# Patient Record
Sex: FEMALE | Race: NATIVE HAWAIIAN OR OTHER PACIFIC ISLANDER | NOT HISPANIC OR LATINO | Employment: FULL TIME | URBAN - METROPOLITAN AREA
[De-identification: names, ages, dates, MRNs, and addresses within clinical notes are randomized per-mention and may not be internally consistent; named-entity substitution may affect disease eponyms.]

---

## 2017-01-27 ENCOUNTER — OFFICE VISIT (OUTPATIENT)
Dept: OTHER | Facility: IMAGING CENTER | Age: 37
End: 2017-01-27
Payer: COMMERCIAL

## 2017-01-27 DIAGNOSIS — Z91.09 ENVIRONMENTAL ALLERGIES: ICD-10-CM

## 2017-01-27 DIAGNOSIS — K58.1 IRRITABLE BOWEL SYNDROME WITH CONSTIPATION: ICD-10-CM

## 2017-01-27 PROCEDURE — 99213 OFFICE O/P EST LOW 20 MIN: CPT | Mod: 25 | Performed by: FAMILY MEDICINE

## 2017-01-27 PROCEDURE — 97810 ACUP 1/> WO ESTIM 1ST 15 MIN: CPT | Performed by: FAMILY MEDICINE

## 2017-01-27 PROCEDURE — 97811 ACUP 1/> W/O ESTIM EA ADD 15: CPT | Performed by: FAMILY MEDICINE

## 2017-01-27 NOTE — MR AVS SNAPSHOT
Afshan Huitron   2017 3:00 PM   Office Visit   MRN: [id number]    Department:  Acupuncture Med   Dept Phone:  [phone]    Description:  Female : 1980   Provider:  Olivia Tiwari M.D.           Allergies as of 2017     No Known Allergies      Vital Signs     Last Menstrual Period Smoking Status                2016 Never Smoker           Basic Information     Date Of Birth Sex Race Ethnicity Preferred Language    1980 Female , Unknown Non- English      Your appointments     Feb 10, 2017  3:30 PM   ACUPUNCTURE 30 with Olivia Tiwari M.D.   [facility] (--)    [address]  [place] [address]   [phone]            2017  3:30 PM   ACUPUNCTURE 30 with Olivia Tiwari M.D.   [facility] (--)    [address]  [place] [address]   [phone]              Problem List              ICD-10-CM Priority Class Noted - Resolved    Irritable bowel syndrome with constipation K58.1   10/26/2016 - Present      Health Maintenance        Date Due Completion Dates    PAP SMEAR 2019 (Done)    Override on 2016: Done    IMM DTaP/Tdap/Td Vaccine (2 - Td) 10/26/2026 10/26/2016            Current Immunizations     Influenza Vaccine Quad Inj (Preserved) 10/26/2016  9:50 AM    MMR Vaccine 2016    Tdap Vaccine 10/26/2016  9:48 AM      Below and/or attached are the medications your provider expects you to take. Review all of your home medications and newly ordered medications with your provider and/or pharmacist. Follow medication instructions as directed by your provider and/or pharmacist. Please keep your medication list with you and share with your provider. Update the information when medications are discontinued, doses are changed, or new medications (including over-the-counter products) are added; and carry medication information at all times in the event of  emergency situations     Allergies:  No Known Allergies          Medications  Valid as of: January 27, 2017 -  3:59 PM    Generic Name Brand Name Tablet Size Instructions for use    .                 Medicines prescribed today were sent to:     [facility] [address] [facility] [address].     [place] [address]    Phone: [phone] Fax: [phone]    Open 24 Hours?: No      Medication refill instructions:       If your prescription bottle indicates you have medication refills left, it is not necessary to call your provider’s office. Please contact your pharmacy and they will refill your medication.    If your prescription bottle indicates you do not have any refills left, you may request refills at any time through one of the following ways: The online [employer] system (except Urgent Care), by calling your provider’s office, or by asking your pharmacy to contact your provider’s office with a refill request. Medication refills are processed only during regular business hours and may not be available until the next business day. Your provider may request additional information or to have a follow-up visit with you prior to refilling your medication.   *Please Note: Medication refills are assigned a new Rx number when refilled electronically. Your pharmacy may indicate that no refills were authorized even though a new prescription for the same medication is available at the pharmacy. Please request the medicine by name with the pharmacy before contacting your provider for a refill.           [employer] Access Code: Activation code not generated  Current [employer] Status: Active

## 2017-01-27 NOTE — PROGRESS NOTES
[facility] Acupuncture Progress Note  [address]  [place] [address]  Dept: [phone]      Patient Name: Afshan Huitron  YOB: 1980  Date of Service: 1/27/2017  2:56 PM    CC IBS and allergies.    HPI This is a 36 year old female here for IBS and allergies.   Has had irritable bowel syndrome and had acupuncture therapy in the past which helped. Has intermittent symptoms. Mostly due to stress previously. Not a lot of stress currently, but anxious about whether she may be allergic to her new condo.   She has been experiencing allergy symptoms with dry eyes, puffy eyes, congestion, some sneezing and cough. No rhinorrhea.    Her previously treatment with acupuncture was back in 2010 or 2011. She tolerated it well and had good benefit from it.   Currently she denies any symptoms of diarrhea or constipation. Denies any blood in stool or black stools.   She is not taking any current medications for her GI issues or her allergy symptoms.      ROS Season: no real favorite  Color preference: no real favorite  Taste preference: sweet and salty.   Lives in [place] for 10 years. Just moved here.   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   Single, no child.   Hobbies- sports, snowboarding, hiking, mountain biking.       PMH Past Medical History   Diagnosis Date   • Endometriosis       SH Social History     Social History   • Marital Status: Single     Spouse Name: N/A   • Number of Children: N/A   • Years of Education: N/A     Social History Main Topics   • Smoking status: Never Smoker    • Smokeless tobacco: Never Used   • Alcohol Use: No   • Drug Use: No   • Sexual Activity: No     Other Topics Concern   • None     Social History Narrative      MEDS No current outpatient prescriptions on file prior to visit.     No current facility-administered medications on file prior to visit.      ALLERGIES No Known Allergies   PE Tongue: pink tip, pale, thin white coat  Pulses: R-  prox->dist,  supf->deep ++/+, +/+, +/++; L-+/+, +/+, +/++     Assesment Eastern Qi deficiency- SP    Western 1. Irritable bowel syndrome with constipation     2. Environmental allergies               Plan Set 1: L -Brooks Yin, Ruiz Ryemundo 3:6, SP 9  Set 2: GV 24.5, LI 20 b/l; L, R- LI 4, LI 11, SP 10, ST 36, SP6  Set 3: ear carroll men b/l, allergy pt b/l  Set 4:      >16 minutes of this 30 minute interview were spent in discussing the benefits and utility of acupuncture.  I answered patient questions about efficacy, safety, and what to expect during a treatment, and the likely number of treatments needed.  Patient will schedule another appointment to return for treatment.  Have encouraged the patient to rest after the acupuncture session today - taking naps or going to sleep early as necessary.  Increase intake of water and refrain from strenuous activities.  Patient may expect to feel transient worsening of symptoms, but this should resolve to benefit in the next day or two after treatment.    Olivia Tiwari M.D.

## 2017-02-02 ENCOUNTER — APPOINTMENT (OUTPATIENT)
Dept: RADIOLOGY | Facility: IMAGING CENTER | Age: 37
End: 2017-02-02
Attending: PHYSICIAN ASSISTANT
Payer: COMMERCIAL

## 2017-02-02 ENCOUNTER — OFFICE VISIT (OUTPATIENT)
Dept: URGENT CARE | Facility: CLINIC | Age: 37
End: 2017-02-02
Payer: COMMERCIAL

## 2017-02-02 VITALS
DIASTOLIC BLOOD PRESSURE: 68 MMHG | WEIGHT: 130 LBS | SYSTOLIC BLOOD PRESSURE: 104 MMHG | RESPIRATION RATE: 16 BRPM | HEART RATE: 76 BPM | OXYGEN SATURATION: 95 % | TEMPERATURE: 98.4 F | BODY MASS INDEX: 21.63 KG/M2
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
## 2017-02-02 DIAGNOSIS — M25.511 ACUTE PAIN OF RIGHT SHOULDER: ICD-10-CM

## 2017-02-02 DIAGNOSIS — S22.31XA CLOSED FRACTURE OF ONE RIB OF RIGHT SIDE, INITIAL ENCOUNTER: ICD-10-CM

## 2017-02-02 PROCEDURE — 99214 OFFICE O/P EST MOD 30 MIN: CPT | Performed by: PHYSICIAN ASSISTANT

## 2017-02-02 PROCEDURE — 73030 X-RAY EXAM OF SHOULDER: CPT | Mod: TC,RT | Performed by: PHYSICIAN ASSISTANT

## 2017-02-02 ASSESSMENT — ENCOUNTER SYMPTOMS
FEVER: 0
SENSORY CHANGE: 0
TINGLING: 0
FOCAL WEAKNESS: 0
CHILLS: 0
FALLS: 1

## 2017-02-02 NOTE — PROGRESS NOTES
Subjective:      Afshan Huitron is a 36 y.o. female who presents with Shoulder Pain            Shoulder Pain  Pertinent negatives include no chills or fever.   NOtes 3wks s/p fall on ice, fall backwards, notes reached back falling back and landed on bilateral elbows jarring shoulder upwards, c/o pain to upper right shoulder, denies PMH of surg or injury to right shoulder. Pt is right hand dominant. Has had issues w/ sleeping due to side sleeper. C/o pain over deltoid of right shoulder.     Review of Systems   Constitutional: Negative for fever and chills.   Musculoskeletal: Positive for joint pain ( pos for right shoulder pain) and falls ( single fall).   Neurological: Negative for tingling, sensory change and focal weakness.       PMH:  has a past medical history of Endometriosis.  MEDS: No current outpatient prescriptions on file.  ALLERGIES: No Known Allergies  SURGHX:   Past Surgical History   Procedure Laterality Date   • Laparoscopy       SOCHX:  reports that she has never smoked. She has never used smokeless tobacco. She reports that she does not drink alcohol or use illicit drugs.  FH: Family history was reviewed, no pertinent findings to report    I have worn a mask for the entire encounter with this patient.      Objective:     /68 mmHg  Pulse 76  Temp(Src) 36.9 °C (98.4 °F)  Resp 16  Wt 58.968 kg (130 lb)  SpO2 95%     Physical Exam   Constitutional: She is oriented to person, place, and time. She appears well-developed and well-nourished. No distress.   HENT:   Head: Normocephalic and atraumatic.   Right Ear: External ear normal.   Left Ear: External ear normal.   Nose: Nose normal.   Eyes: Conjunctivae and lids are normal. Right eye exhibits no discharge. Left eye exhibits no discharge. No scleral icterus.   Neck: Neck supple.   Pulmonary/Chest: Effort normal and breath sounds normal. No respiratory distress. She has no decreased breath sounds. She has no wheezes. She has no rhonchi. She has  no rales.       Musculoskeletal: Normal range of motion.        Right shoulder: She exhibits tenderness ( mild over delt and very mild over ACJ) and pain. She exhibits normal range of motion, no bony tenderness, no swelling, no effusion, no crepitus, no deformity, no laceration, no spasm, normal pulse and normal strength ( RC 5/5).   NEG speeds/neers/jergasons       Neurological: She is alert and oriented to person, place, and time. She is not disoriented. Coordination and gait normal.   Skin: Skin is warm and dry. She is not diaphoretic. No erythema. No pallor.   Psychiatric: Her speech is normal and behavior is normal.   Nursing note and vitals reviewed.    Dx shoulder -   2/2/2017 9:15 AM    HISTORY/REASON FOR EXAM:  Pain/Deformity Following Trauma  Fall on ice 3 weeks ago    TECHNIQUE/EXAM DESCRIPTION AND NUMBER OF VIEWS:  3 views of the RIGHT shoulder.    COMPARISON: None    FINDINGS: Examination is technically limited.  There is no evidence of fracture or dislocation. Glenohumeral and acromioclavicular articulation is maintained.  Visualized right lung field is clear. Deformity of the posterior eighth rib on the right likely represents a fracture in advanced stages of   healing.           Impression        No evidence of acute fracture or dislocation.    Deformity of the posterior eighth rib on the right likely represents a fracture in advanced stages of healing.         Reading Provider Reading Date     Magalis Johnson M.D. Feb 2, 2017         Signing Provider Signing Date Signing Time     Magalis Johnson M.D. Feb 2, 2017  9:20 AM               Assessment/Plan:     1. Closed fracture of one rib of right side, initial encounter  Recommend conservative care, rest, ice, elevation, work on gentle ROM exercises, avoid nsaids for tylenol OTC, declines any additional meds, trend resolution, PROM shoulder  RTC w/ cough / fever/chest congestion, deep breathing exercises discussed  Return to clinic with lack of  resolution or progression of symptoms.      2. Acute pain of right shoulder    - DX-SHOULDER 2+ RIGHT; Future    3. Fall, initial encounter

## 2017-02-02 NOTE — MR AVS SNAPSHOT
Afshan Huitron   2017 8:45 AM   Office Visit   MRN: [id number]    Department:  [facility]   Dept Phone:  [phone]    Description:  Female : 1980   Provider:  Jonatan Bashir PA-C           Reason for Visit     Shoulder Pain x 3 wk, Rt. shoulder pain after fall.  Pain is worse w/ some movements      Allergies as of 2017     No Known Allergies      You were diagnosed with     Acute pain of right shoulder   [[id number]]       Right rotator cuff tendinitis   [[id number]]         Vital Signs     Blood Pressure Pulse Temperature Respirations Weight Oxygen Saturation    104/68 mmHg 76 36.9 °C (98.4 °F) 16 58.968 kg (130 lb) 95%    Smoking Status                   Never Smoker            Basic Information     Date Of Birth Sex Race Ethnicity Preferred Language    1980 Female , Unknown Non- English      Your appointments     Feb 10, 2017  3:30 PM   ACUPUNCTURE 30 with Olivia Tiwari M.D.   [facility] (--)    [address]  [place] [address]   [phone]            2017  3:30 PM   ACUPUNCTURE 30 with Olivia Tiwari M.D.   [facility] (--)    [address]  [place] [address]   [phone]              Problem List              ICD-10-CM Priority Class Noted - Resolved    Irritable bowel syndrome with constipation K58.1   10/26/2016 - Present      Health Maintenance        Date Due Completion Dates    PAP SMEAR 2019 (Done)    Override on 2016: Done    IMM DTaP/Tdap/Td Vaccine (2 - Td) 10/26/2026 10/26/2016            Current Immunizations     Influenza Vaccine Quad Inj (Preserved) 10/26/2016  9:50 AM    MMR Vaccine 2016    Tdap Vaccine 10/26/2016  9:48 AM      Below and/or attached are the medications your provider expects you to take. Review all of your home medications and newly ordered medications with your provider and/or pharmacist. Follow  medication instructions as directed by your provider and/or pharmacist. Please keep your medication list with you and share with your provider. Update the information when medications are discontinued, doses are changed, or new medications (including over-the-counter products) are added; and carry medication information at all times in the event of emergency situations     Allergies:  No Known Allergies          Medications  Valid as of: February 02, 2017 -  9:34 AM    Generic Name Brand Name Tablet Size Instructions for use    .                 Medicines prescribed today were sent to:     [facility] [address] [facility].    [address] [place] [address] [place] [address]    Phone: [phone] Fax: [phone]    Open 24 Hours?: No      Medication refill instructions:       If your prescription bottle indicates you have medication refills left, it is not necessary to call your provider’s office. Please contact your pharmacy and they will refill your medication.    If your prescription bottle indicates you do not have any refills left, you may request refills at any time through one of the following ways: The online [employer] system (except Urgent Care), by calling your provider’s office, or by asking your pharmacy to contact your provider’s office with a refill request. Medication refills are processed only during regular business hours and may not be available until the next business day. Your provider may request additional information or to have a follow-up visit with you prior to refilling your medication.   *Please Note: Medication refills are assigned a new Rx number when refilled electronically. Your pharmacy may indicate that no refills were authorized even though a new prescription for the same medication is available at the pharmacy. Please request the medicine by name with the pharmacy before contacting your provider for a refill.        Your To Do List     Future Labs/Procedures Complete By Expires     DX-SHOULDER 2+ RIGHT  As directed 2/2/2018         [employer] Access Code: Activation code not generated  Current [employer] Status: Active

## 2017-02-24 ENCOUNTER — OFFICE VISIT (OUTPATIENT)
Dept: OTHER | Facility: IMAGING CENTER | Age: 37
End: 2017-02-24
Payer: COMMERCIAL

## 2017-02-24 DIAGNOSIS — K58.1 IRRITABLE BOWEL SYNDROME WITH CONSTIPATION: ICD-10-CM

## 2017-02-24 DIAGNOSIS — Z91.09 ENVIRONMENTAL ALLERGIES: ICD-10-CM

## 2017-02-24 PROCEDURE — 97810 ACUP 1/> WO ESTIM 1ST 15 MIN: CPT | Performed by: FAMILY MEDICINE

## 2017-02-24 PROCEDURE — 99213 OFFICE O/P EST LOW 20 MIN: CPT | Mod: 25 | Performed by: FAMILY MEDICINE

## 2017-02-24 PROCEDURE — 97811 ACUP 1/> W/O ESTIM EA ADD 15: CPT | Performed by: FAMILY MEDICINE

## 2017-02-24 NOTE — MR AVS SNAPSHOT
Afshan Huitron   2017 3:30 PM   Office Visit   MRN: [id number]    Department:  Acupuncture Med   Dept Phone:  [phone]    Description:  Female : 1980   Provider:  Olivia Tiwari M.D.           Allergies as of 2017     No Known Allergies      Vital Signs     Smoking Status                   Never Smoker            Basic Information     Date Of Birth Sex Race Ethnicity Preferred Language    1980 Female , Unknown Non- English      Problem List              ICD-10-CM Priority Class Noted - Resolved    Irritable bowel syndrome with constipation K58.1   10/26/2016 - Present      Health Maintenance        Date Due Completion Dates    PAP SMEAR 2019 (Done)    Override on 2016: Done    IMM DTaP/Tdap/Td Vaccine (2 - Td) 10/26/2026 10/26/2016            Current Immunizations     Influenza Vaccine Quad Inj (Preserved) 10/26/2016  9:50 AM    MMR Vaccine 2016    Tdap Vaccine 10/26/2016  9:48 AM      Below and/or attached are the medications your provider expects you to take. Review all of your home medications and newly ordered medications with your provider and/or pharmacist. Follow medication instructions as directed by your provider and/or pharmacist. Please keep your medication list with you and share with your provider. Update the information when medications are discontinued, doses are changed, or new medications (including over-the-counter products) are added; and carry medication information at all times in the event of emergency situations     Allergies:  No Known Allergies          Medications  Valid as of: 2017 -  4:31 PM    Generic Name Brand Name Tablet Size Instructions for use    .                 Medicines prescribed today were sent to:     [facility] - [facility] - [address] [facility].    [address] [place], [place] [address] [address]    Phone: [phone] Fax: [phone]    Open 24 Hours?: No      Medication refill instructions:       If your  prescription bottle indicates you have medication refills left, it is not necessary to call your provider’s office. Please contact your pharmacy and they will refill your medication.    If your prescription bottle indicates you do not have any refills left, you may request refills at any time through one of the following ways: The online [employer] system (except Urgent Care), by calling your provider’s office, or by asking your pharmacy to contact your provider’s office with a refill request. Medication refills are processed only during regular business hours and may not be available until the next business day. Your provider may request additional information or to have a follow-up visit with you prior to refilling your medication.   *Please Note: Medication refills are assigned a new Rx number when refilled electronically. Your pharmacy may indicate that no refills were authorized even though a new prescription for the same medication is available at the pharmacy. Please request the medicine by name with the pharmacy before contacting your provider for a refill.           [employer] Access Code: Activation code not generated  Current [employer] Status: Active

## 2017-02-24 NOTE — PROGRESS NOTES
[facility] Acupuncture Progress Note  [address]  [place] [address]  Dept: [phone]      Patient Name: Afshan Huitron  YOB: 1980  Date of Service: 2/24/2017  3:40 PM    CC IBS and allergies.    HPI This is a 36 year old female here for IBS and allergies.   Has had irritable bowel syndrome and had acupuncture therapy in the past which helped.   Initial treatment was good. Had improvements in IBS for a while after her treatment.  She has noted some improvement in her allergies as well.   Not on medication therapy for her issues.   Right lateral shoulder region with pain with some movements. Had prior fall.      ROS Season: no real favorite  Color preference: no real favorite  Taste preference: sweet and salty.   Lives in [place] for 10 years. Just moved here.   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   Single, no child.   Hobbies- sports, snowboarding, hiking, mountain biking.       PMH Past Medical History   Diagnosis Date   • Endometriosis       SH Social History     Social History   • Marital Status: Single     Spouse Name: N/A   • Number of Children: N/A   • Years of Education: N/A     Social History Main Topics   • Smoking status: Never Smoker    • Smokeless tobacco: Never Used   • Alcohol Use: No   • Drug Use: No   • Sexual Activity: No     Other Topics Concern   • Not on file     Social History Narrative      MEDS No current outpatient prescriptions on file prior to visit.     No current facility-administered medications on file prior to visit.      ALLERGIES No Known Allergies   PE Tongue:  Not examined  Pulses:  Not examined.     Assesment Eastern Qi deficiency- SP    Western 1. Irritable bowel syndrome with constipation     2. Environmental allergies               Plan Set 1: L -Brooks Yin, Ruiz Reymundo 3:6, SP 9  Set 2: GV 24.5, LI 20 b/l; L, R- LI 4, LI 11, SP 10, ST 36, SP 6  Set 3: ear carroll men b/l, allergy pt b/l  Set 4:    Follow up in 1 week.     > 15 minutes of face  to face time spent with patient, excluding time spent for procedure.     Olivia Tiwari M.D.

## 2017-06-07 ENCOUNTER — HOSPITAL ENCOUNTER (OUTPATIENT)
Facility: MEDICAL CENTER | Age: 37
End: 2017-06-07
Attending: PHYSICIAN ASSISTANT
Payer: COMMERCIAL

## 2017-06-07 ENCOUNTER — OFFICE VISIT (OUTPATIENT)
Dept: URGENT CARE | Facility: CLINIC | Age: 37
End: 2017-06-07
Payer: COMMERCIAL

## 2017-06-07 VITALS
BODY MASS INDEX: 20.66 KG/M2 | OXYGEN SATURATION: 99 % | SYSTOLIC BLOOD PRESSURE: 104 MMHG | RESPIRATION RATE: 14 BRPM | DIASTOLIC BLOOD PRESSURE: 64 MMHG | WEIGHT: 124 LBS | HEART RATE: 68 BPM | TEMPERATURE: 99.4 F | HEIGHT: 65 IN

## 2017-06-07 DIAGNOSIS — L08.9 SUPERFICIAL SKIN INFECTION: ICD-10-CM

## 2017-06-07 PROCEDURE — 99214 OFFICE O/P EST MOD 30 MIN: CPT | Performed by: PHYSICIAN ASSISTANT

## 2017-06-07 PROCEDURE — 87205 SMEAR GRAM STAIN: CPT

## 2017-06-07 PROCEDURE — 87075 CULTR BACTERIA EXCEPT BLOOD: CPT

## 2017-06-07 PROCEDURE — 87070 CULTURE OTHR SPECIMN AEROBIC: CPT

## 2017-06-07 PROCEDURE — 87077 CULTURE AEROBIC IDENTIFY: CPT

## 2017-06-07 RX ORDER — SULFAMETHOXAZOLE AND TRIMETHOPRIM 800; 160 MG/1; MG/1
1 TABLET ORAL 2 TIMES DAILY
Qty: 10 TAB | Refills: 0 | Status: SHIPPED | OUTPATIENT
Start: 2017-06-07 | End: 2017-06-11

## 2017-06-07 ASSESSMENT — ENCOUNTER SYMPTOMS
CHILLS: 0
MUSCULOSKELETAL NEGATIVE: 1
DIARRHEA: 0
NAUSEA: 0
SHORTNESS OF BREATH: 0
VOMITING: 0
HEADACHES: 0
DIZZINESS: 0
FEVER: 0
ABDOMINAL PAIN: 0

## 2017-06-07 NOTE — MR AVS SNAPSHOT
"        Afshan Huitron   2017 10:15 AM   Office Visit   MRN: [id number]    Department:  [facility]   Dept Phone:  [phone]    Description:  Female : 1980   Provider:  Greer Joshi PA-C           Reason for Visit     Abrasion (R) calf x 3 days and concern it might be infected and wants to rule out DVT      Allergies as of 2017     No Known Allergies      You were diagnosed with     Superficial skin infection   [[id number]]         Vital Signs     Blood Pressure Pulse Temperature Respirations Height Weight    104/64 mmHg 68 37.4 °C (99.4 °F) 14 1.651 m (5' 5\") 56.246 kg (124 lb)    Body Mass Index Oxygen Saturation Smoking Status             20.63 kg/m2 99% Never Smoker          Basic Information     Date Of Birth Sex Race Ethnicity Preferred Language    1980 Female , Unknown Non- English      Problem List              ICD-10-CM Priority Class Noted - Resolved    Irritable bowel syndrome with constipation K58.1   10/26/2016 - Present      Health Maintenance        Date Due Completion Dates    PAP SMEAR 2019 (Done)    Override on 2016: Done    IMM DTaP/Tdap/Td Vaccine (2 - Td) 10/26/2026 10/26/2016            Current Immunizations     Influenza Vaccine Quad Inj (Preserved) 10/26/2016  9:50 AM    MMR Vaccine 2016    Tdap Vaccine 10/26/2016  9:48 AM      Below and/or attached are the medications your provider expects you to take. Review all of your home medications and newly ordered medications with your provider and/or pharmacist. Follow medication instructions as directed by your provider and/or pharmacist. Please keep your medication list with you and share with your provider. Update the information when medications are discontinued, doses are changed, or new medications (including over-the-counter products) are added; and carry medication information at all times in the event of emergency situations     Allergies:  No Known Allergies          Medications "  Valid as of: June 07, 2017 - 11:31 AM    Generic Name Brand Name Tablet Size Instructions for use    Sulfamethoxazole-Trimethoprim (Tab) BACTRIM -160 MG Take 1 Tab by mouth 2 times a day for 5 days.        .                 Medicines prescribed today were sent to:     [facility] [place] - [address] [facility].    [address] [facility] [place] [address]    Phone: [phone] Fax: [phone]    Open 24 Hours?: No      Medication refill instructions:       If your prescription bottle indicates you have medication refills left, it is not necessary to call your provider’s office. Please contact your pharmacy and they will refill your medication.    If your prescription bottle indicates you do not have any refills left, you may request refills at any time through one of the following ways: The online [employer] system (except Urgent Care), by calling your provider’s office, or by asking your pharmacy to contact your provider’s office with a refill request. Medication refills are processed only during regular business hours and may not be available until the next business day. Your provider may request additional information or to have a follow-up visit with you prior to refilling your medication.   *Please Note: Medication refills are assigned a new Rx number when refilled electronically. Your pharmacy may indicate that no refills were authorized even though a new prescription for the same medication is available at the pharmacy. Please request the medicine by name with the pharmacy before contacting your provider for a refill.           [employer] Access Code: Activation code not generated  Current [employer] Status: Active
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
## 2017-06-08 ENCOUNTER — HOSPITAL ENCOUNTER (EMERGENCY)
Facility: MEDICAL CENTER | Age: 37
End: 2017-06-08
Attending: EMERGENCY MEDICINE
Payer: COMMERCIAL

## 2017-06-08 VITALS
SYSTOLIC BLOOD PRESSURE: 130 MMHG | DIASTOLIC BLOOD PRESSURE: 86 MMHG | OXYGEN SATURATION: 99 % | RESPIRATION RATE: 16 BRPM | HEIGHT: 65 IN | WEIGHT: 124.34 LBS | TEMPERATURE: 99.7 F | HEART RATE: 77 BPM | BODY MASS INDEX: 20.72 KG/M2

## 2017-06-08 DIAGNOSIS — L03.116 CELLULITIS OF LEFT LOWER EXTREMITY: ICD-10-CM

## 2017-06-08 LAB
GRAM STN SPEC: NORMAL
SIGNIFICANT IND 70042: NORMAL
SITE SITE: NORMAL
SOURCE SOURCE: NORMAL

## 2017-06-08 PROCEDURE — 99284 EMERGENCY DEPT VISIT MOD MDM: CPT

## 2017-06-08 PROCEDURE — A9270 NON-COVERED ITEM OR SERVICE: HCPCS | Performed by: EMERGENCY MEDICINE

## 2017-06-08 PROCEDURE — 700102 HCHG RX REV CODE 250 W/ 637 OVERRIDE(OP): Performed by: EMERGENCY MEDICINE

## 2017-06-08 RX ORDER — CEPHALEXIN 500 MG/1
500 CAPSULE ORAL ONCE
Status: COMPLETED | OUTPATIENT
Start: 2017-06-08 | End: 2017-06-08

## 2017-06-08 RX ORDER — SULFAMETHOXAZOLE AND TRIMETHOPRIM 800; 160 MG/1; MG/1
2 TABLET ORAL ONCE
Status: COMPLETED | OUTPATIENT
Start: 2017-06-08 | End: 2017-06-08

## 2017-06-08 RX ORDER — CEPHALEXIN 500 MG/1
500 CAPSULE ORAL 4 TIMES DAILY
Qty: 28 CAP | Refills: 0 | Status: ON HOLD | OUTPATIENT
Start: 2017-06-08 | End: 2017-06-17

## 2017-06-08 RX ORDER — SULFAMETHOXAZOLE AND TRIMETHOPRIM 800; 160 MG/1; MG/1
2 TABLET ORAL EVERY 12 HOURS
Qty: 28 TAB | Refills: 0 | Status: ON HOLD | OUTPATIENT
Start: 2017-06-08 | End: 2017-06-17

## 2017-06-08 RX ADMIN — SULFAMETHOXAZOLE AND TRIMETHOPRIM 2 TABLET: 800; 160 TABLET ORAL at 22:22

## 2017-06-08 RX ADMIN — CEPHALEXIN 500 MG: 500 CAPSULE ORAL at 22:22

## 2017-06-08 ASSESSMENT — LIFESTYLE VARIABLES: DO YOU DRINK ALCOHOL: NO

## 2017-06-08 ASSESSMENT — PAIN SCALES - GENERAL: PAINLEVEL_OUTOF10: 8

## 2017-06-08 NOTE — ED AVS SNAPSHOT
Home Care Instructions                                                                                                                Afshan Huitron   MRN: [id number]    Department:  [facility], Emergency Dept   Date of Visit:  6/8/2017            [facility], Emergency Dept    [address] [address]    Phone:  [phone]      You were seen by     César Busby M.D.      Your Diagnosis Was     Cellulitis of left lower extremity     L03.116       Follow-up Information     1. Follow up with George Ta M.D. In 2 days.    Specialty:  Internal Medicine    Contact information    [address]  [address]  [facility] [address] [phone]          2. Follow up with [facility], Emergency Dept.    Specialty:  Emergency Medicine    Why:  If symptoms worsen    Contact information    [address] [facility] [address] [phone]      Medication Information     Review all of your home medications and newly ordered medications with your primary doctor and/or pharmacist as soon as possible. Follow medication instructions as directed by your doctor and/or pharmacist.     Please keep your complete medication list with you and share with your physician. Update the information when medications are discontinued, doses are changed, or new medications (including over-the-counter products) are added; and carry medication information at all times in the event of emergency situations.               Medication List      START taking these medications        Instructions    Morning Afternoon Evening Bedtime    cephALEXin 500 MG Caps   Commonly known as:  KEFLEX        Take 1 Cap by mouth 4 times a day.   Dose:  500 mg                          ASK your doctor about these medications        Instructions    Morning Afternoon Evening Bedtime    * sulfamethoxazole-trimethoprim 800-160 MG tablet   What changed:  Another medication with the same name was  added. Make sure you understand how and when to take each.   Commonly known as:  BACTRIM DS   Ask about: Which instructions should I use?        Take 1 Tab by mouth 2 times a day for 5 days.   Dose:  1 Tab                        * sulfamethoxazole-trimethoprim 800-160 MG tablet   What changed:  You were already taking a medication with the same name, and this prescription was added. Make sure you understand how and when to take each.   Commonly known as:  BACTRIM DS   Ask about: Which instructions should I use?        Take 2 Tabs by mouth every 12 hours for 7 days.   Dose:  2 Tab                        * Notice:  This list has 2 medication(s) that are the same as other medications prescribed for you. Read the directions carefully, and ask your doctor or other care provider to review them with you.         Where to Get Your Medications      You can get these medications from any pharmacy     Bring a paper prescription for each of these medications    - cephALEXin 500 MG Caps  - sulfamethoxazole-trimethoprim 800-160 MG tablet              Discharge Instructions       Cellulitis  Cellulitis is an infection of the skin and the tissue under the skin. The infected area is usually red and tender. This happens most often in the arms and lower legs.  HOME CARE   · Take your antibiotic medicine as told. Finish the medicine even if you start to feel better.  · Keep the infected arm or leg raised (elevated).  · Put a warm cloth on the area up to 4 times per day.  · Only take medicines as told by your doctor.  · Keep all doctor visits as told.  GET HELP IF:  · You see red streaks on the skin coming from the infected area.  · Your red area gets bigger or turns a dark color.  · Your bone or joint under the infected area is painful after the skin heals.  · Your infection comes back in the same area or different area.  · You have a puffy (swollen) bump in the infected area.  · You have new symptoms.  · You have a fever.  GET HELP  RIGHT AWAY IF:   · You feel very sleepy.  · You throw up (vomit) or have watery poop (diarrhea).  · You feel sick and have muscle aches and pains.  MAKE SURE YOU:   · Understand these instructions.  · Will watch your condition.  · Will get help right away if you are not doing well or get worse.     This information is not intended to replace advice given to you by your health care provider. Make sure you discuss any questions you have with your health care provider.     Document Released: 06/05/2009 Document Revised: 01/08/2016 Document Reviewed: 03/04/2013  [employer] Patient Education ©2016 [employer]            Patient Information     Patient Information    Following emergency treatment: all patient requiring follow-up care must return either to a private physician or a clinic if your condition worsens before you are able to obtain further medical attention, please return to the emergency room.     Billing Information    At [facility], we work to make the billing process streamlined for our patients.  Our Representatives are here to answer any questions you may have regarding your hospital bill.  If you have insurance coverage and have supplied your insurance information to us, we will submit a claim to your insurer on your behalf.  Should you have any questions regarding your bill, we can be reached online or by phone as follows:  Online: You are able pay your bills online or live chat with our representatives about any billing questions you may have. We are here to help Monday - Friday from 8:00am to 7:30pm and 9:00am - 12:00pm on Saturdays.  Please visit [email or web address]  for more information.   Phone:  [phone] or [phone]    Please note that your emergency physician, surgeon, pathologist, radiologist, anesthesiologist, and other specialists are not employed by [facility] and will therefore bill separately for their services.  Please contact them  directly for any questions concerning their bills at the numbers below:     Emergency Physician Services:  [phone]  [facility]:  [phone]  [facility]:  [phone]  [facility]:  [phone]    1. Your final bill may vary from the amount quoted upon discharge if all procedures are not complete at that time, or if your doctor has additional procedures of which we are not aware. You will receive an additional bill if you return to the Emergency Department at Critical access hospital for suture removal regardless of the facility of which the sutures were placed.     2. Please arrange for settlement of this account at the emergency registration.    3. All self-pay accounts are due in full at the time of treatment.  If you are unable to meet this obligation then payment is expected within 4-5 days.     4. If you have had radiology studies (CT, X-ray, Ultrasound, MRI), you have received a preliminary result during your emergency department visit. Please contact the radiology department [phone] to receive a copy of your final result. Please discuss the Final result with your primary physician or with the follow up physician provided.     Crisis Hotline:  [place] Crisis Hotline:  9-212-FRLDION or 1-583.384.9661  [place] Crisis Hotline:    1-508.354.1861 or 645-246-6534         ED Discharge Follow Up Questions    1. In order to provide you with very good care, we would like to follow up with a phone call in the next few days.  May we have your permission to contact you?     YES /  NO    2. What is the best phone number to call you? (       )_____-__________    3. What is the best time to call you?      Morning  /  Afternoon  /  Evening                   Patient Signature:  ____________________________________________________________    Date:  ____________________________________________________________

## 2017-06-08 NOTE — ED AVS SNAPSHOT
[employer] Access Code: Activation code not generated  Current [employer] Status: Active    [employer]  A secure, online tool to manage your health information     [employer]® is a secure, online tool that connects you to your personalized health information from the privacy of your home -- day or night - making it very easy for you to manage your healthcare. Once the activation process is completed, you can even access your medical information using the [employer] orestes, which is available for free in the Apple Orestes store or Google Play store.     [employer] provides the following levels of access (as shown below):   My Chart Features   [facility] Primary Care Doctor [facility]  Specialists [facility]  Urgent  Care Non-[facility]  Primary Care  Doctor   Email your healthcare team securely and privately 24/7 X X X X   Manage appointments: schedule your next appointment; view details of past/upcoming appointments X      Request prescription refills. X      View recent personal medical records, including lab and immunizations X X X X   View health record, including health history, allergies, medications X X X X   Read reports about your outpatient visits, procedures, consult and ER notes X X X X   See your discharge summary, which is a recap of your hospital and/or ER visit that includes your diagnosis, lab results, and care plan. X X       How to register for [employer]:  1. Go to  [email or web address].  2. Click on the Sign Up Now box, which takes you to the New Member Sign Up page. You will need to provide the following information:  a. Enter your [employer] Access Code exactly as it appears at the top of this page. (You will not need to use this code after you’ve completed the sign-up process. If you do not sign up before the expiration date, you must request a new code.)   b. Enter your date of birth.   c. Enter your home email address.   d. Click Submit, and follow the next screen’s instructions.  3. Create a [employer] ID. This will  be your [employer] login ID and cannot be changed, so think of one that is secure and easy to remember.  4. Create a [employer] password. You can change your password at any time.  5. Enter your Password Reset Question and Answer. This can be used at a later time if you forget your password.   6. Enter your e-mail address. This allows you to receive e-mail notifications when new information is available in [employer].  7. Click Sign Up. You can now view your health information.    For assistance activating your [employer] account, call [phone]

## 2017-06-08 NOTE — ED AVS SNAPSHOT
6/8/2017    Afshan Huitron  [address]  [place] [address]    Dear Afshan:    [facility] wants to ensure your discharge home is safe and you or your loved ones have had all of your questions answered regarding your care after you leave the hospital.    Below is a list of resources and contact information should you have any questions regarding your hospital stay, follow-up instructions, or active medical symptoms.    Questions or Concerns Regarding… Contact   Medical Questions Related to Your Discharge  (7 days a week, 8am-5pm) Contact a Nurse Care Coordinator   [phone]   Medical Questions Not Related to Your Discharge  (24 hours a day / 7 days a week)  Contact the Nurse Health Line   [phone]    Medications or Discharge Instructions Refer to your discharge packet   or contact your [facility] Primary Care Provider   [phone]   Follow-up Appointment(s) Schedule your appointment via [employer]   or contact Scheduling [phone]   Billing Review your statement via [employer]  or contact Billing [phone]   Medical Records Review your records via [employer]   or contact Medical Records [phone]     You may receive a telephone call within two days of discharge. This call is to make certain you understand your discharge instructions and have the opportunity to have any questions answered. You can also easily access your medical information, test results and upcoming appointments via the [employer] free online health management tool. You can learn more and sign up at [employer] [facility]. For assistance setting up your [employer] account, please call [phone].    Once again, we want to ensure your discharge home is safe and that you have a clear understanding of any next steps in your care. If you have any questions or concerns, please do not hesitate to contact us, we are here for you. Thank you for choosing [facility] for your healthcare needs.    Sincerely,    Your [facility] Healthcare Team

## 2017-06-09 LAB
BACTERIA WND AEROBE CULT: ABNORMAL
GRAM STN SPEC: ABNORMAL
SIGNIFICANT IND 70042: ABNORMAL
SITE SITE: ABNORMAL
SOURCE SOURCE: ABNORMAL

## 2017-06-09 NOTE — ED NOTES
Pt ambulatory with steady gait, pt verbalized understanding of poc and discharge , no questions at this time.  Family at bedside, educated pt to remain in room for 10min to ensure no reaction to medications given.  VSS

## 2017-06-09 NOTE — ED NOTES
Pt ambulatory to triage with RLE cellulitis, dx at urgent care yesterday & prescribed Bactrim.  Pt received phone call today that the wound was strep positive & that she needs to be put on a different antibiotic.
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
## 2017-06-09 NOTE — DISCHARGE INSTRUCTIONS
Cellulitis  Cellulitis is an infection of the skin and the tissue under the skin. The infected area is usually red and tender. This happens most often in the arms and lower legs.  HOME CARE   · Take your antibiotic medicine as told. Finish the medicine even if you start to feel better.  · Keep the infected arm or leg raised (elevated).  · Put a warm cloth on the area up to 4 times per day.  · Only take medicines as told by your doctor.  · Keep all doctor visits as told.  GET HELP IF:  · You see red streaks on the skin coming from the infected area.  · Your red area gets bigger or turns a dark color.  · Your bone or joint under the infected area is painful after the skin heals.  · Your infection comes back in the same area or different area.  · You have a puffy (swollen) bump in the infected area.  · You have new symptoms.  · You have a fever.  GET HELP RIGHT AWAY IF:   · You feel very sleepy.  · You throw up (vomit) or have watery poop (diarrhea).  · You feel sick and have muscle aches and pains.  MAKE SURE YOU:   · Understand these instructions.  · Will watch your condition.  · Will get help right away if you are not doing well or get worse.     This information is not intended to replace advice given to you by your health care provider. Make sure you discuss any questions you have with your health care provider.     Document Released: 06/05/2009 Document Revised: 01/08/2016 Document Reviewed: 03/04/2013  [employer] Patient Education ©2016 [employer]

## 2017-06-10 LAB
BACTERIA SPEC ANAEROBE CULT: NORMAL
SIGNIFICANT IND 70042: NORMAL
SITE SITE: NORMAL
SOURCE SOURCE: NORMAL

## 2017-06-11 ENCOUNTER — HOSPITAL ENCOUNTER (INPATIENT)
Facility: MEDICAL CENTER | Age: 37
LOS: 6 days | DRG: 872 | End: 2017-06-17
Attending: EMERGENCY MEDICINE | Admitting: INTERNAL MEDICINE
Payer: COMMERCIAL

## 2017-06-11 ENCOUNTER — APPOINTMENT (OUTPATIENT)
Dept: RADIOLOGY | Facility: MEDICAL CENTER | Age: 37
DRG: 872 | End: 2017-06-11
Attending: EMERGENCY MEDICINE
Payer: COMMERCIAL

## 2017-06-11 ENCOUNTER — RESOLUTE PROFESSIONAL BILLING HOSPITAL PROF FEE (OUTPATIENT)
Dept: HOSPITALIST | Facility: MEDICAL CENTER | Age: 37
End: 2017-06-11
Payer: COMMERCIAL

## 2017-06-11 PROBLEM — L03.90 CELLULITIS: Status: ACTIVE | Noted: 2017-06-11

## 2017-06-11 LAB
ANION GAP SERPL CALC-SCNC: 9 MMOL/L (ref 0–11.9)
BASOPHILS # BLD AUTO: 0.7 % (ref 0–1.8)
BASOPHILS # BLD: 0.05 K/UL (ref 0–0.12)
BUN SERPL-MCNC: 12 MG/DL (ref 8–22)
CALCIUM SERPL-MCNC: 9.1 MG/DL (ref 8.5–10.5)
CHLORIDE SERPL-SCNC: 95 MMOL/L (ref 96–112)
CO2 SERPL-SCNC: 25 MMOL/L (ref 20–33)
CREAT SERPL-MCNC: 1.18 MG/DL (ref 0.5–1.4)
EOSINOPHIL # BLD AUTO: 0.09 K/UL (ref 0–0.51)
EOSINOPHIL NFR BLD: 1.3 % (ref 0–6.9)
ERYTHROCYTE [DISTWIDTH] IN BLOOD BY AUTOMATED COUNT: 40.9 FL (ref 35.9–50)
GFR SERPL CREATININE-BSD FRML MDRD: 51 ML/MIN/1.73 M 2
GLUCOSE SERPL-MCNC: 92 MG/DL (ref 65–99)
HCT VFR BLD AUTO: 39.6 % (ref 37–47)
HGB BLD-MCNC: 13.8 G/DL (ref 12–16)
IMM GRANULOCYTES # BLD AUTO: 0.04 K/UL (ref 0–0.11)
IMM GRANULOCYTES NFR BLD AUTO: 0.6 % (ref 0–0.9)
LYMPHOCYTES # BLD AUTO: 1.15 K/UL (ref 1–4.8)
LYMPHOCYTES NFR BLD: 16.1 % (ref 22–41)
MCH RBC QN AUTO: 34.4 PG (ref 27–33)
MCHC RBC AUTO-ENTMCNC: 34.8 G/DL (ref 33.6–35)
MCV RBC AUTO: 98.8 FL (ref 81.4–97.8)
MONOCYTES # BLD AUTO: 0.55 K/UL (ref 0–0.85)
MONOCYTES NFR BLD AUTO: 7.7 % (ref 0–13.4)
NEUTROPHILS # BLD AUTO: 5.25 K/UL (ref 2–7.15)
NEUTROPHILS NFR BLD: 73.6 % (ref 44–72)
NRBC # BLD AUTO: 0 K/UL
NRBC BLD AUTO-RTO: 0 /100 WBC
PLATELET # BLD AUTO: 291 K/UL (ref 164–446)
PMV BLD AUTO: 9.9 FL (ref 9–12.9)
POTASSIUM SERPL-SCNC: 3.9 MMOL/L (ref 3.6–5.5)
RBC # BLD AUTO: 4.01 M/UL (ref 4.2–5.4)
SODIUM SERPL-SCNC: 129 MMOL/L (ref 135–145)
WBC # BLD AUTO: 7.1 K/UL (ref 4.8–10.8)

## 2017-06-11 PROCEDURE — 99223 1ST HOSP IP/OBS HIGH 75: CPT | Performed by: INTERNAL MEDICINE

## 2017-06-11 PROCEDURE — 80048 BASIC METABOLIC PNL TOTAL CA: CPT

## 2017-06-11 PROCEDURE — 96366 THER/PROPH/DIAG IV INF ADDON: CPT

## 2017-06-11 PROCEDURE — 700112 HCHG RX REV CODE 229: Performed by: INTERNAL MEDICINE

## 2017-06-11 PROCEDURE — 36415 COLL VENOUS BLD VENIPUNCTURE: CPT

## 2017-06-11 PROCEDURE — 96365 THER/PROPH/DIAG IV INF INIT: CPT

## 2017-06-11 PROCEDURE — 99285 EMERGENCY DEPT VISIT HI MDM: CPT

## 2017-06-11 PROCEDURE — 700101 HCHG RX REV CODE 250: Performed by: INTERNAL MEDICINE

## 2017-06-11 PROCEDURE — 770006 HCHG ROOM/CARE - MED/SURG/GYN SEMI*

## 2017-06-11 PROCEDURE — 700105 HCHG RX REV CODE 258: Performed by: INTERNAL MEDICINE

## 2017-06-11 PROCEDURE — A9270 NON-COVERED ITEM OR SERVICE: HCPCS | Performed by: INTERNAL MEDICINE

## 2017-06-11 PROCEDURE — 76881 US COMPL JOINT R-T W/IMG: CPT

## 2017-06-11 PROCEDURE — 700111 HCHG RX REV CODE 636 W/ 250 OVERRIDE (IP): Performed by: INTERNAL MEDICINE

## 2017-06-11 PROCEDURE — 700111 HCHG RX REV CODE 636 W/ 250 OVERRIDE (IP): Performed by: EMERGENCY MEDICINE

## 2017-06-11 PROCEDURE — 85025 COMPLETE CBC W/AUTO DIFF WBC: CPT

## 2017-06-11 PROCEDURE — 93971 EXTREMITY STUDY: CPT

## 2017-06-11 RX ORDER — PROMETHAZINE HYDROCHLORIDE 25 MG/1
12.5-25 TABLET ORAL EVERY 4 HOURS PRN
Status: DISCONTINUED | OUTPATIENT
Start: 2017-06-11 | End: 2017-06-17 | Stop reason: HOSPADM

## 2017-06-11 RX ORDER — ACETAMINOPHEN 325 MG/1
650 TABLET ORAL EVERY 6 HOURS PRN
Status: DISCONTINUED | OUTPATIENT
Start: 2017-06-11 | End: 2017-06-12

## 2017-06-11 RX ORDER — ONDANSETRON 2 MG/ML
4 INJECTION INTRAMUSCULAR; INTRAVENOUS EVERY 4 HOURS PRN
Status: DISCONTINUED | OUTPATIENT
Start: 2017-06-11 | End: 2017-06-17 | Stop reason: HOSPADM

## 2017-06-11 RX ORDER — ONDANSETRON 4 MG/1
4 TABLET, ORALLY DISINTEGRATING ORAL EVERY 4 HOURS PRN
Status: DISCONTINUED | OUTPATIENT
Start: 2017-06-11 | End: 2017-06-17 | Stop reason: HOSPADM

## 2017-06-11 RX ORDER — DOCUSATE SODIUM 100 MG/1
100 CAPSULE, LIQUID FILLED ORAL 2 TIMES DAILY
Status: DISCONTINUED | OUTPATIENT
Start: 2017-06-11 | End: 2017-06-17 | Stop reason: HOSPADM

## 2017-06-11 RX ORDER — AMPICILLIN AND SULBACTAM 2; 1 G/1; G/1
3 INJECTION, POWDER, FOR SOLUTION INTRAMUSCULAR; INTRAVENOUS ONCE
Status: COMPLETED | OUTPATIENT
Start: 2017-06-11 | End: 2017-06-11

## 2017-06-11 RX ORDER — PROMETHAZINE HYDROCHLORIDE 25 MG/1
12.5-25 SUPPOSITORY RECTAL EVERY 4 HOURS PRN
Status: DISCONTINUED | OUTPATIENT
Start: 2017-06-11 | End: 2017-06-17 | Stop reason: HOSPADM

## 2017-06-11 RX ORDER — SODIUM CHLORIDE 9 MG/ML
1000 INJECTION, SOLUTION INTRAVENOUS CONTINUOUS
Status: DISCONTINUED | OUTPATIENT
Start: 2017-06-11 | End: 2017-06-14

## 2017-06-11 RX ADMIN — AMPICILLIN SODIUM AND SULBACTAM SODIUM 3 G: 2; 1 INJECTION, POWDER, FOR SOLUTION INTRAMUSCULAR; INTRAVENOUS at 18:11

## 2017-06-11 RX ADMIN — DOXYCYCLINE 100 MG: 100 INJECTION, POWDER, LYOPHILIZED, FOR SOLUTION INTRAVENOUS at 14:30

## 2017-06-11 RX ADMIN — AMPICILLIN SODIUM AND SULBACTAM SODIUM 3 G: 2; 1 INJECTION, POWDER, FOR SOLUTION INTRAMUSCULAR; INTRAVENOUS at 13:32

## 2017-06-11 RX ADMIN — SODIUM CHLORIDE 1000 ML: 9 INJECTION, SOLUTION INTRAVENOUS at 13:32

## 2017-06-11 RX ADMIN — DOCUSATE SODIUM 100 MG: 100 CAPSULE ORAL at 20:28

## 2017-06-11 RX ADMIN — AMPICILLIN SODIUM AND SULBACTAM SODIUM 3 G: 2; 1 INJECTION, POWDER, FOR SOLUTION INTRAMUSCULAR; INTRAVENOUS at 08:55

## 2017-06-11 RX ADMIN — SODIUM CHLORIDE 1000 ML: 9 INJECTION, SOLUTION INTRAVENOUS at 20:30

## 2017-06-11 ASSESSMENT — PAIN SCALES - GENERAL
PAINLEVEL_OUTOF10: 3
PAINLEVEL_OUTOF10: 0

## 2017-06-11 ASSESSMENT — LIFESTYLE VARIABLES
ALCOHOL_USE: NO
EVER_SMOKED: NEVER

## 2017-06-11 NOTE — IP AVS SNAPSHOT
Platform Solutions Access Code: Activation code not generated  Current Platform Solutions Status: Active    [employer]  A secure, online tool to manage your health information     [employer]’s Platform [employer]® is a secure, online tool that connects you to your personalized health information from the privacy of your home -- day or night - making it very easy for you to manage your healthcare. Once the activation process is completed, you can even access your medical information using the Platform Solutions orestes, which is available for free in the Apple Orestes store or Google Play store.     Platform Solutions provides the following levels of access (as shown below):   My Chart Features   [facility] Primary Care Doctor [facility]  Specialists [facility]  Urgent  Care Non-[facility]  Primary Care  Doctor   Email your healthcare team securely and privately 24/7 X X X X   Manage appointments: schedule your next appointment; view details of past/upcoming appointments X      Request prescription refills. X      View recent personal medical records, including lab and immunizations X X X X   View health record, including health history, allergies, medications X X X X   Read reports about your outpatient visits, procedures, consult and ER notes X X X X   See your discharge summary, which is a recap of your hospital and/or ER visit that includes your diagnosis, lab results, and care plan. X X       How to register for Platform Solutions:  1. Go to  [email or web address].  2. Click on the Sign Up Now box, which takes you to the New Member Sign Up page. You will need to provide the following information:  a. Enter your [employer] Access Code exactly as it appears at the top of this page. (You will not need to use this code after you’ve completed the sign-up process. If you do not sign up before the expiration date, you must request a new code.)   b. Enter your date of birth.   c. Enter your home email address.   d. Click Submit, and follow the next screen’s instructions.  3. Create a Platform Solutions ID. This will  be your [employer] login ID and cannot be changed, so think of one that is secure and easy to remember.  4. Create a [employer] password. You can change your password at any time.  5. Enter your Password Reset Question and Answer. This can be used at a later time if you forget your password.   6. Enter your e-mail address. This allows you to receive e-mail notifications when new information is available in [employer].  7. Click Sign Up. You can now view your health information.    For assistance activating your [employer] account, call [phone]
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
## 2017-06-11 NOTE — ED NOTES
Med Rec completed per patient  Allergies reviewed    Patient is on day 3 of her antibiotic courses
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
## 2017-06-11 NOTE — ED NOTES
RN assist- Pt transported to S629-02, all belongings accounted for.  Floor aware of pt's impending arrival.

## 2017-06-11 NOTE — IP AVS SNAPSHOT
6/17/2017    Afshan Huitron  [address]  [place] [address]    Dear Afshan:    [facility] wants to ensure your discharge home is safe and you or your loved ones have had all of your questions answered regarding your care after you leave the hospital.    Below is a list of resources and contact information should you have any questions regarding your hospital stay, follow-up instructions, or active medical symptoms.    Questions or Concerns Regarding… Contact   Medical Questions Related to Your Discharge  (7 days a week, 8am-5pm) Contact a Nurse Care Coordinator   [phone]   Medical Questions Not Related to Your Discharge  (24 hours a day / 7 days a week)  Contact the Nurse Health Line   [phone]    Medications or Discharge Instructions Refer to your discharge packet   or contact your [facility] Primary Care Provider   [phone]   Follow-up Appointment(s) Schedule your appointment via [employer]   or contact Scheduling [phone]   Billing Review your statement via [employer]  or contact Billing [phone]   Medical Records Review your records via [employer]   or contact Medical Records [phone]     You may receive a telephone call within two days of discharge. This call is to make certain you understand your discharge instructions and have the opportunity to have any questions answered. You can also easily access your medical information, test results and upcoming appointments via the [employer] free online health management tool. You can learn more and sign up at [employer]. For assistance setting up your [employer] account, please call [phone].    Once again, we want to ensure your discharge home is safe and that you have a clear understanding of any next steps in your care. If you have any questions or concerns, please do not hesitate to contact us, we are here for you. Thank you for choosing [facility] for your healthcare needs.    Sincerely,    Your [facility] Healthcare Team

## 2017-06-11 NOTE — IP AVS SNAPSHOT
" <p align=\"LEFT\"><IMG SRC=\"//[email or web address]$/[email or web address]\" alt=\"Image\" WIDTH=\"50%\" HEIGHT=\"200\" BORDER=\"\"></p>                   Name:Afshan Huitron  Medical Record Number:[id number]  CSN: [id number]    YOB: 1980   Age: 36 y.o.  Sex: female  HT:  WT: 57.1 kg (125 lb 14.1 oz)          Admit Date: 6/11/2017     Discharge Date:   Today's Date: 6/17/2017  Attending Doctor:  Rosendo Yates M.D.                  Allergies:  Review of patient's allergies indicates no known allergies.          Your appointments     Jul 06, 2017  3:40 PM   Established Patient with George Ta M.D.   [facility] [address] [place] [address] ([address])    [address] [place] [id number] [phone]           You will be receiving a confirmation call a few days before your appointment from our automated call confirmation system.              Follow-up Information     1. Follow up with George Ta M.D. In 2 weeks.    Specialty:  Internal Medicine    Contact information    [address] [place] [id number] [phone]           Medication List      Take these Medications        Instructions    amoxicillin-clavulanate 875-125 MG Tabs   Commonly known as:  AUGMENTIN    Take 1 Tab by mouth 2 times a day for 7 days.   Dose:  1 Tab       doxycycline 100 MG Tabs   Commonly known as:  VIBRAMYCIN    Take 1 Tab by mouth 2 times a day for 7 days.   Dose:  100 mg       lactobacillus granules Pack    Take 1 Packet by mouth 2 times a day, with meals.   Dose:  1 Packet         "

## 2017-06-11 NOTE — IP AVS SNAPSHOT
Home Care Instructions                                                                                                                  Name:Afshan Huitron  Medical Record Number:[id number]  CSN: [id number]    YOB: 1980   Age: 36 y.o.  Sex: female  HT:  WT: 57.1 kg (125 lb 14.1 oz)          Admit Date: 6/11/2017     Discharge Date:   Today's Date: 6/17/2017  Attending Doctor:  Rosendo Yates M.D.                  Allergies:  Review of patient's allergies indicates no known allergies.            Discharge Instructions       Discharge Instructions    Discharged to home by car with relative. Discharged via wheelchair, hospital escort: Yes.  Special equipment needed: Not Applicable    Be sure to schedule a follow-up appointment with your primary care doctor or any specialists as instructed.     Discharge Plan:   Diet Plan: Discussed  Activity Level: Discussed  Confirmed Follow up Appointment: Patient to Call and Schedule Appointment  Confirmed Symptoms Management: Discussed  Medication Reconciliation Updated: Yes  Influenza Vaccine Indication: Not indicated: Previously immunized this influenza season and > 8 years of age    I understand that a diet low in cholesterol, fat, and sodium is recommended for good health. Unless I have been given specific instructions below for another diet, I accept this instruction as my diet prescription.   Other diet: Regular, healthy diet    Special Instructions: Cellulitis  Cellulitis is an infection of the skin and the tissue beneath it. The infected area is usually red and tender. Cellulitis occurs most often in the arms and lower legs.   CAUSES   Cellulitis is caused by bacteria that enter the skin through cracks or cuts in the skin. The most common types of bacteria that cause cellulitis are staphylococci and streptococci.  SIGNS AND SYMPTOMS   · Redness and warmth.  · Swelling.  · Tenderness or pain.  · Fever.  DIAGNOSIS   Your health care provider can usually  determine what is wrong based on a physical exam. Blood tests may also be done.  TREATMENT   Treatment usually involves taking an antibiotic medicine.  HOME CARE INSTRUCTIONS   · Take your antibiotic medicine as directed by your health care provider. Finish the antibiotic even if you start to feel better.  · Keep the infected arm or leg elevated to reduce swelling.  · Apply a warm cloth to the affected area up to 4 times per day to relieve pain.  · Take medicines only as directed by your health care provider.  · Keep all follow-up visits as directed by your health care provider.  SEEK MEDICAL CARE IF:   · You notice red streaks coming from the infected area.  · Your red area gets larger or turns dark in color.  · Your bone or joint underneath the infected area becomes painful after the skin has healed.  · Your infection returns in the same area or another area.  · You notice a swollen bump in the infected area.  · You develop new symptoms.  · You have a fever.  SEEK IMMEDIATE MEDICAL CARE IF:   · You feel very sleepy.  · You develop vomiting or diarrhea.  · You have a general ill feeling (malaise) with muscle aches and pains.  MAKE SURE YOU:   · Understand these instructions.  · Will watch your condition.  · Will get help right away if you are not doing well or get worse.     This information is not intended to replace advice given to you by your health care provider. Make sure you discuss any questions you have with your health care provider.     Antibiotic Medication  Antibiotics are among the most frequently prescribed medicines. Antibiotics cure illness by assisting our body to injure or kill the bacteria that cause infection. While antibiotics are useful to treat a wide variety of infections they are useless against viruses. Antibiotics cannot cure colds, flu, or other viral infections.   There are many types of antibiotics available. Your caregiver will decide which antibiotic will be useful for an illness.  Never take or give someone else's antibiotics or left over medicine.  Your caregiver may also take into account:  · Allergies.  · The cost of the medicine.  · Dosing schedules.  · Taste.  · Common side effects when choosing an antibiotic for an infection.  Ask your caregiver if you have questions about why a certain medicine was chosen.  HOME CARE INSTRUCTIONS  Read all instructions and labels on medicine bottles carefully. Some antibiotics should be taken on an empty stomach while others should be taken with food. Taking antibiotics incorrectly may reduce how well they work. Some antibiotics need to be kept in the refrigerator. Others should be kept at room temperature. Ask your caregiver or pharmacist if you do not understand how to give the medicine.  Be sure to give the amount of medicine your caregiver has prescribed. Even if you feel better and your symptoms improve, bacteria may still remain alive in the body. Taking all of the medicine will prevent:  · The infection from returning and becoming harder to treat.  · Complications from partially treated infections.  If there is any medicine left over after you have taken the medicine as your caregiver has instructed, throw the medicine away.  Be sure to tell your caregiver if you:  · Are allergic to any medicines.  · Are pregnant or intend to become pregnant while using this medicine.  · Are breastfeeding.  · Are taking any other prescription, non-prescription medicine, or herbal remedies.  · Have any other medical conditions or problems you have not already discussed.  If you are taking birth control pills, they may not work while you are on antibiotics. To avoid unwanted pregnancy:  · Continue taking your birth control pills as usual.  · Use a second form of birth control (such as condoms) while you are taking antibiotic medicine.  · When you finish taking the antibiotic medicine, continue using the second form of birth control until you are finished with your  current 1 month cycle of birth control pills.  Try not to miss any doses of medicine. If you miss a dose, take it as soon as possible. However, if it is almost time for the next dose and the dosing schedule is:  · 2 doses a day, take the missed dose and the next dose 5 to 6 hours apart.  · 3 or more doses a day, take the missed dose and the next dose 2 to 4 hours apart, then go back to the normal schedule.  · If you are unable to make up a missed dose, take the next scheduled dose on time and complete the missed dose at the end of the prescribed time for your medicine.  SIDE EFFECTS TO TAKING ANTIBIOTICS  Common side effects to antibiotic use include:  · Soft stools or diarrhea.  · Mild stomach upset.  · Sun sensitivity.  SEEK MEDICAL CARE IF:   · If you get worse or do not improve within a few days of starting the medicine.  · Vomiting develops.  · Diaper rash or rash on the genitals appears.  · Vaginal itching occurs.  · White patches appear on the tongue or in the mouth.  · Severe watery diarrhea and abdominal cramps occur.  · Signs of an allergy develop (hives, unknown itchy rash appears). STOP TAKING THE ANTIBIOTIC.  SEEK IMMEDIATE MEDICAL CARE IF:   · Urine turns dark or blood colored.  · Skin turns yellow.  · Easy bruising or bleeding occurs.  · Joint pain or muscle aches occur.  · Fever returns.  · Severe headache occurs.  · Signs of an allergy develop (trouble breathing, wheezing, swelling of the lips, face or tongue, fainting, or blisters on the skin or in the mouth). STOP TAKING THE ANTIBIOTIC.     This information is not intended to replace advice given to you by your health care provider. Make sure you discuss any questions you have with your health care provider.     · Is patient discharged on Warfarin / Coumadin?   No     · Is patient Post Blood Transfusion?  No    Depression / Suicide Risk    As you are discharged from this [facility] facility, it is important to learn how to keep safe from  harming yourself.    Recognize the warning signs:  · Abrupt changes in personality, positive or negative- including increase in energy   · Giving away possessions  · Change in eating patterns- significant weight changes-  positive or negative  · Change in sleeping patterns- unable to sleep or sleeping all the time   · Unwillingness or inability to communicate  · Depression  · Unusual sadness, discouragement and loneliness  · Talk of wanting to die  · Neglect of personal appearance   · Rebelliousness- reckless behavior  · Withdrawal from people/activities they love  · Confusion- inability to concentrate     If you or a loved one observes any of these behaviors or has concerns about self-harm, here's what you can do:  · Talk about it- your feelings and reasons for harming yourself  · Remove any means that you might use to hurt yourself (examples: pills, rope, extension cords, firearm)  · Get professional help from the community (Mental Health, Substance Abuse, psychological counseling)  · Do not be alone:Call your Safe Contact- someone whom you trust who will be there for you.  · Call your local CRISIS HOTLINE 426-5802 or 357-766-8158  · Call your local Children's Mobile Crisis Response Team [place] (356) 404-3789 or www.Sqrrl  · Call the toll free National Suicide Prevention Hotlines   · National Suicide Prevention Lifeline 400-344-LNHE (9770)  · National Hope Line Network 800-SUICIDE (482-0234)        Your appointments     Jul 06, 2017  3:40 PM   Established Patient with George Ta M.D.   [facility] ([address])     [address]  [facility] [address] [place] [id number] [phone]           You will be receiving a confirmation call a few days before your appointment from our automated call confirmation system.              Follow-up Information     1. Follow up with George Ta M.D. In 2 weeks.    Specialty:  Internal Medicine    Contact information    [address]  [place] [address]  [phone]           Discharge Medication Instructions:    Below are the medications your physician expects you to take upon discharge:    Review all your home medications and newly ordered medications with your doctor and/or pharmacist. Follow medication instructions as directed by your doctor and/or pharmacist.    Please keep your medication list with you and share with your physician.               Medication List      START taking these medications        Instructions    Morning Afternoon Evening Bedtime    amoxicillin-clavulanate 875-125 MG Tabs   Commonly known as:  AUGMENTIN        Take 1 Tab by mouth 2 times a day for 7 days.   Dose:  1 Tab                        doxycycline 100 MG Tabs   Commonly known as:  VIBRAMYCIN        Take 1 Tab by mouth 2 times a day for 7 days.   Dose:  100 mg                        lactobacillus granules Pack   Last time this was given:  1 Packet on 6/17/2017  7:42 AM        Take 1 Packet by mouth 2 times a day, with meals.   Dose:  1 Packet                          STOP taking these medications     cephALEXin 500 MG Caps   Commonly known as:  KEFLEX               sulfamethoxazole-trimethoprim 800-160 MG tablet   Commonly known as:  BACTRIM DS                    Where to Get Your Medications      Information about where to get these medications is not yet available     ! Ask your nurse or doctor about these medications    - amoxicillin-clavulanate 875-125 MG Tabs  - doxycycline 100 MG Tabs  - lactobacillus granules Pack            Instructions           Diet / Nutrition:    Follow any diet instructions given to you by your doctor or the dietician, including how much salt (sodium) you are allowed each day.    If you are overweight, talk to your doctor about a weight reduction plan.    Activity:    Remain physically active following your doctor's instructions about exercise and activity.    Rest often.     Any time you become even a little tired or short  of breath, SIT DOWN and rest.    Worsening Symptoms:    Report any of the following signs and symptoms to the doctor's office immediately:    *Pain of jaw, arm, or neck  *Chest pain not relieved by medication                               *Dizziness or loss of consciousness  *Difficulty breathing even when at rest   *More tired than usual                                       *Bleeding drainage or swelling of surgical site  *Swelling of feet, ankles, legs or stomach                 *Fever (>100ºF)  *Pink or blood tinged sputum  *Weight gain (3lbs/day or 5lbs /week)           *Shock from internal defibrillator (if applicable)  *Palpitations or irregular heartbeats                *Cool and/or numb extremities    Stroke Awareness    Common Risk Factors for Stroke include:    Age  Atrial Fibrillation  Carotid Artery Stenosis  Diabetes Mellitus  Excessive alcohol consumption  High blood pressure  Overweight   Physical inactivity  Smoking    Warning signs and symptoms of a stroke include:    *Sudden numbness or weakness of the face, arm or leg (especially on one side of the body).  *Sudden confusion, trouble speaking or understanding.  *Sudden trouble seeing in one or both eyes.  *Sudden trouble walking, dizziness, loss of balance or coordination.Sudden severe headache with no known cause.    It is very important to get treatment quickly when a stroke occurs. If you experience any of the above warning signs, call 911 immediately.                   Disclaimer         Quit Smoking / Tobacco Use:    I understand the use of any tobacco products increases my chance of suffering from future heart disease or stroke and could cause other illnesses which may shorten my life. Quitting the use of tobacco products is the single most important thing I can do to improve my health. For further information on smoking / tobacco cessation call a Toll Free Quit Line at 1-430.991.2944 (*National Cancer [place]) or 1-788.408.7678 (American  Lung Association) or you can access the web based program at www.lungusa.org.    [place] Tobacco Users Help Line:  (598) 828-4406       Toll Free: 1-586.571.1264  Quit Tobacco Program [facility] (911)589-9015    Crisis Hotline:    [place] Crisis Hotline:  8-566-QFNEHCH or 1-790.589.7711    [place] Crisis Hotline:    1-321.684.3442 or 553-298-5155    Discharge Survey:   Thank you for choosing [facility]. We hope we did everything we could to make your hospital stay a pleasant one. You may be receiving a phone survey and we would appreciate your time and participation in answering the questions. Your input is very valuable to us in our efforts to improve our service to our patients and their families.        My signature on this form indicates that:    1. I have reviewed and understand the above information.  2. My questions regarding this information have been answered to my satisfaction.  3. I have formulated a plan with my discharge nurse to obtain my prescribed medications for home.                  Disclaimer         __________________________________                     __________       ________                       Patient Signature                                                 Date                    Time

## 2017-06-12 PROBLEM — A41.9 SEPSIS WITH HYPOTENSION (HCC): Status: ACTIVE | Noted: 2017-06-12

## 2017-06-12 PROBLEM — E87.1 HYPONATREMIA: Status: ACTIVE | Noted: 2017-06-12

## 2017-06-12 PROBLEM — I95.9 SEPSIS WITH HYPOTENSION (HCC): Status: ACTIVE | Noted: 2017-06-12

## 2017-06-12 PROBLEM — E87.1 HYPONATREMIA: Status: RESOLVED | Noted: 2017-06-12 | Resolved: 2017-06-12

## 2017-06-12 LAB
ANION GAP SERPL CALC-SCNC: 7 MMOL/L (ref 0–11.9)
BASOPHILS # BLD AUTO: 1 % (ref 0–1.8)
BASOPHILS # BLD: 0.06 K/UL (ref 0–0.12)
BUN SERPL-MCNC: 13 MG/DL (ref 8–22)
CALCIUM SERPL-MCNC: 8.8 MG/DL (ref 8.5–10.5)
CHLORIDE SERPL-SCNC: 104 MMOL/L (ref 96–112)
CO2 SERPL-SCNC: 24 MMOL/L (ref 20–33)
CREAT SERPL-MCNC: 1.03 MG/DL (ref 0.5–1.4)
EOSINOPHIL # BLD AUTO: 0.16 K/UL (ref 0–0.51)
EOSINOPHIL NFR BLD: 2.6 % (ref 0–6.9)
ERYTHROCYTE [DISTWIDTH] IN BLOOD BY AUTOMATED COUNT: 41.1 FL (ref 35.9–50)
GFR SERPL CREATININE-BSD FRML MDRD: 60 ML/MIN/1.73 M 2
GLUCOSE SERPL-MCNC: 88 MG/DL (ref 65–99)
HCT VFR BLD AUTO: 37 % (ref 37–47)
HGB BLD-MCNC: 12.5 G/DL (ref 12–16)
IMM GRANULOCYTES # BLD AUTO: 0.01 K/UL (ref 0–0.11)
IMM GRANULOCYTES NFR BLD AUTO: 0.2 % (ref 0–0.9)
LYMPHOCYTES # BLD AUTO: 1.47 K/UL (ref 1–4.8)
LYMPHOCYTES NFR BLD: 24.1 % (ref 22–41)
MCH RBC QN AUTO: 33.7 PG (ref 27–33)
MCHC RBC AUTO-ENTMCNC: 33.8 G/DL (ref 33.6–35)
MCV RBC AUTO: 99.7 FL (ref 81.4–97.8)
MONOCYTES # BLD AUTO: 0.49 K/UL (ref 0–0.85)
MONOCYTES NFR BLD AUTO: 8 % (ref 0–13.4)
NEUTROPHILS # BLD AUTO: 3.92 K/UL (ref 2–7.15)
NEUTROPHILS NFR BLD: 64.1 % (ref 44–72)
NRBC # BLD AUTO: 0 K/UL
NRBC BLD AUTO-RTO: 0 /100 WBC
PLATELET # BLD AUTO: 288 K/UL (ref 164–446)
PMV BLD AUTO: 10 FL (ref 9–12.9)
POTASSIUM SERPL-SCNC: 4 MMOL/L (ref 3.6–5.5)
RBC # BLD AUTO: 3.71 M/UL (ref 4.2–5.4)
SODIUM SERPL-SCNC: 135 MMOL/L (ref 135–145)
WBC # BLD AUTO: 6.1 K/UL (ref 4.8–10.8)

## 2017-06-12 PROCEDURE — A9270 NON-COVERED ITEM OR SERVICE: HCPCS | Performed by: HOSPITALIST

## 2017-06-12 PROCEDURE — 770006 HCHG ROOM/CARE - MED/SURG/GYN SEMI*

## 2017-06-12 PROCEDURE — 87040 BLOOD CULTURE FOR BACTERIA: CPT

## 2017-06-12 PROCEDURE — 700112 HCHG RX REV CODE 229: Performed by: INTERNAL MEDICINE

## 2017-06-12 PROCEDURE — 700111 HCHG RX REV CODE 636 W/ 250 OVERRIDE (IP): Performed by: INTERNAL MEDICINE

## 2017-06-12 PROCEDURE — 700101 HCHG RX REV CODE 250: Performed by: INTERNAL MEDICINE

## 2017-06-12 PROCEDURE — 36415 COLL VENOUS BLD VENIPUNCTURE: CPT

## 2017-06-12 PROCEDURE — 700105 HCHG RX REV CODE 258: Performed by: INTERNAL MEDICINE

## 2017-06-12 PROCEDURE — 85025 COMPLETE CBC W/AUTO DIFF WBC: CPT

## 2017-06-12 PROCEDURE — 99233 SBSQ HOSP IP/OBS HIGH 50: CPT | Performed by: HOSPITALIST

## 2017-06-12 PROCEDURE — 80048 BASIC METABOLIC PNL TOTAL CA: CPT

## 2017-06-12 PROCEDURE — A9270 NON-COVERED ITEM OR SERVICE: HCPCS | Performed by: INTERNAL MEDICINE

## 2017-06-12 PROCEDURE — 700102 HCHG RX REV CODE 250 W/ 637 OVERRIDE(OP): Performed by: HOSPITALIST

## 2017-06-12 RX ORDER — ACETAMINOPHEN 325 MG/1
650 TABLET ORAL EVERY 6 HOURS PRN
Status: DISCONTINUED | OUTPATIENT
Start: 2017-06-12 | End: 2017-06-17 | Stop reason: HOSPADM

## 2017-06-12 RX ADMIN — DOXYCYCLINE 100 MG: 100 INJECTION, POWDER, LYOPHILIZED, FOR SOLUTION INTRAVENOUS at 14:30

## 2017-06-12 RX ADMIN — DOCUSATE SODIUM 100 MG: 100 CAPSULE ORAL at 08:10

## 2017-06-12 RX ADMIN — AMPICILLIN SODIUM AND SULBACTAM SODIUM 3 G: 2; 1 INJECTION, POWDER, FOR SOLUTION INTRAMUSCULAR; INTRAVENOUS at 12:05

## 2017-06-12 RX ADMIN — SODIUM CHLORIDE 1000 ML: 9 INJECTION, SOLUTION INTRAVENOUS at 17:59

## 2017-06-12 RX ADMIN — AMPICILLIN SODIUM AND SULBACTAM SODIUM 3 G: 2; 1 INJECTION, POWDER, FOR SOLUTION INTRAMUSCULAR; INTRAVENOUS at 00:17

## 2017-06-12 RX ADMIN — DOXYCYCLINE 100 MG: 100 INJECTION, POWDER, LYOPHILIZED, FOR SOLUTION INTRAVENOUS at 02:33

## 2017-06-12 RX ADMIN — ACETAMINOPHEN 650 MG: 325 TABLET, FILM COATED ORAL at 20:28

## 2017-06-12 RX ADMIN — SODIUM CHLORIDE 1000 ML: 9 INJECTION, SOLUTION INTRAVENOUS at 05:10

## 2017-06-12 RX ADMIN — DOCUSATE SODIUM 100 MG: 100 CAPSULE ORAL at 20:25

## 2017-06-12 RX ADMIN — AMPICILLIN SODIUM AND SULBACTAM SODIUM 3 G: 2; 1 INJECTION, POWDER, FOR SOLUTION INTRAMUSCULAR; INTRAVENOUS at 05:10

## 2017-06-12 RX ADMIN — AMPICILLIN SODIUM AND SULBACTAM SODIUM 3 G: 2; 1 INJECTION, POWDER, FOR SOLUTION INTRAMUSCULAR; INTRAVENOUS at 17:59

## 2017-06-12 ASSESSMENT — ENCOUNTER SYMPTOMS
NAUSEA: 0
VOMITING: 0
FEVER: 0
CHILLS: 0

## 2017-06-12 ASSESSMENT — PAIN SCALES - GENERAL
PAINLEVEL_OUTOF10: 4
PAINLEVEL_OUTOF10: 3

## 2017-06-12 NOTE — CARE PLAN
Problem: Safety  Goal: Will remain free from injury  Outcome: PROGRESSING AS EXPECTED  Bed position low, call light within reach, treaded footwear, fall risk education    Problem: Infection  Goal: Will remain free from infection  Outcome: PROGRESSING AS EXPECTED  Hand hygiene and infection prevention education
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
## 2017-06-12 NOTE — PROGRESS NOTES
[facility]ist Progress Note    Date of Service: 2017    Chief Complaint  36 y.o. female admitted 2017 with R-calf cellulitis, hypotension/sepsis    Interval Problem Update  Decreasing redness.    Consultants/Specialty      Disposition  Pending        Review of Systems   Constitutional: Negative for fever and chills.   Gastrointestinal: Negative for nausea and vomiting.   Skin: Positive for rash. Negative for itching.   All other systems reviewed and are negative.     Physical Exam  Laboratory/Imaging   Hemodynamics  Temp (24hrs), Av.7 °C (98.1 °F), Min:36.3 °C (97.3 °F), Max:37.1 °C (98.8 °F)   Temperature: 36.3 °C (97.3 °F)  Pulse  Av.8  Min: 55  Max: 78    Blood Pressure: 103/73 mmHg      Respiratory      Respiration: 18, Pulse Oximetry: 97 %             Fluids    Intake/Output Summary (Last 24 hours) at 17 1407  Last data filed at 17 1400   Gross per 24 hour   Intake   1140 ml   Output      0 ml   Net   1140 ml       Nutrition  Orders Placed This Encounter   Procedures   • Diet Order     Standing Status: Standing      Number of Occurrences: 1      Standing Expiration Date:      Order Specific Question:  Diet:     Answer:  Regular [1]     Physical Exam  Nursing note and vitals reviewed.  Constitutional: She is oriented to person, place, and time. She appears well-developed and well-nourished.   HENT:   Head: Normocephalic and atraumatic.   Right Ear: External ear normal.   Left Ear: External ear normal.   Nose: Nose normal.   Mouth/Throat: Oropharynx is small with Mallanpati score of 3.  Mucosa is clear and moist.   Eyes: Conjunctivae and extraocular motions are normal. Pupils are equal, round, and reactive to light.   Neck: Normal range of motion. Neck supple.   Cardiovascular: Normal rate, regular rhythm, normal heart sounds and intact distal pulses.    Pulmonary/Chest: Effort normal and breath sounds normal.   Abdominal: Soft. Bowel sounds are normal.   Musculoskeletal:  Normal range of motion.   Neurological: She is alert and oriented to person, place, and time.   Skin: Skin is warm and dry.  R-calf c erythem, edema and warmth.        Recent Labs      06/11/17   0810  06/12/17   0027   WBC  7.1  6.1   RBC  4.01*  3.71*   HEMOGLOBIN  13.8  12.5   HEMATOCRIT  39.6  37.0   MCV  98.8*  99.7*   MCH  34.4*  33.7*   MCHC  34.8  33.8   RDW  40.9  41.1   PLATELETCT  291  288   MPV  9.9  10.0     Recent Labs      06/11/17   0810  06/12/17   0027   SODIUM  129*  135   POTASSIUM  3.9  4.0   CHLORIDE  95*  104   CO2  25  24   GLUCOSE  92  88   BUN  12  13   CREATININE  1.18  1.03   CALCIUM  9.1  8.8                      Assessment/Plan     Cellulitis  Assessment & Plan  Improving.  Cont unasyn and doxy.  Follow.    Sepsis with hypotension (CMS-HCC)  Assessment & Plan  Aggressive hydration and follow.    Stable issues - hypotension.    Preventives - Vax, stool soft, DVTP.    Dispo - complex/guarded.    Medications reviewed, Radiology images reviewed and Labs reviewed  Walton catheter: No Walton      DVT Prophylaxis: Enoxaparin (Lovenox)    Ulcer prophylaxis: Not indicated  Antibiotics: Treating active infection/contamination beyond 24 hours perioperative coverage  Assessed for rehab: Patient returned to prior level of function, rehabilitation not indicated at this time

## 2017-06-12 NOTE — CARE PLAN
Problem: Venous Thromboembolism (VTW)/Deep Vein Thrombosis (DVT) Prevention:  Goal: Patient will participate in Venous Thrombosis (VTE)/Deep Vein Thrombosis (DVT)Prevention Measures  Outcome: PROGRESSING AS EXPECTED    Problem: Pain Management  Goal: Pain level will decrease to patient’s comfort goal  Outcome: PROGRESSING AS EXPECTED
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
## 2017-06-13 ENCOUNTER — APPOINTMENT (OUTPATIENT)
Dept: RADIOLOGY | Facility: MEDICAL CENTER | Age: 37
DRG: 872 | End: 2017-06-13
Attending: FAMILY MEDICINE
Payer: COMMERCIAL

## 2017-06-13 PROBLEM — D75.89 MACROCYTOSIS: Status: ACTIVE | Noted: 2017-06-13

## 2017-06-13 PROBLEM — E87.1 HYPONATREMIA: Status: ACTIVE | Noted: 2017-06-13

## 2017-06-13 PROBLEM — N28.9 RENAL INSUFFICIENCY: Status: ACTIVE | Noted: 2017-06-13

## 2017-06-13 PROCEDURE — 700101 HCHG RX REV CODE 250: Performed by: INTERNAL MEDICINE

## 2017-06-13 PROCEDURE — 99233 SBSQ HOSP IP/OBS HIGH 50: CPT | Performed by: FAMILY MEDICINE

## 2017-06-13 PROCEDURE — 700112 HCHG RX REV CODE 229: Performed by: INTERNAL MEDICINE

## 2017-06-13 PROCEDURE — 700105 HCHG RX REV CODE 258: Performed by: PHARMACIST

## 2017-06-13 PROCEDURE — 73701 CT LOWER EXTREMITY W/DYE: CPT | Mod: RT

## 2017-06-13 PROCEDURE — A9270 NON-COVERED ITEM OR SERVICE: HCPCS | Performed by: INTERNAL MEDICINE

## 2017-06-13 PROCEDURE — 700117 HCHG RX CONTRAST REV CODE 255: Performed by: FAMILY MEDICINE

## 2017-06-13 PROCEDURE — 700101 HCHG RX REV CODE 250: Performed by: PHARMACIST

## 2017-06-13 PROCEDURE — 700102 HCHG RX REV CODE 250 W/ 637 OVERRIDE(OP): Performed by: FAMILY MEDICINE

## 2017-06-13 PROCEDURE — 700111 HCHG RX REV CODE 636 W/ 250 OVERRIDE (IP): Performed by: INTERNAL MEDICINE

## 2017-06-13 PROCEDURE — 770006 HCHG ROOM/CARE - MED/SURG/GYN SEMI*

## 2017-06-13 PROCEDURE — 700105 HCHG RX REV CODE 258: Performed by: INTERNAL MEDICINE

## 2017-06-13 PROCEDURE — A9270 NON-COVERED ITEM OR SERVICE: HCPCS | Performed by: FAMILY MEDICINE

## 2017-06-13 RX ORDER — L. ACIDOPHILUS/L.BULGARICUS 100MM CELL
1 GRANULES IN PACKET (EA) ORAL 2 TIMES DAILY WITH MEALS
Status: DISCONTINUED | OUTPATIENT
Start: 2017-06-13 | End: 2017-06-17 | Stop reason: HOSPADM

## 2017-06-13 RX ORDER — DOXYCYCLINE 100 MG/1
100 TABLET ORAL EVERY 12 HOURS
Status: DISCONTINUED | OUTPATIENT
Start: 2017-06-14 | End: 2017-06-17 | Stop reason: HOSPADM

## 2017-06-13 RX ADMIN — AMPICILLIN SODIUM AND SULBACTAM SODIUM 3 G: 2; 1 INJECTION, POWDER, FOR SOLUTION INTRAMUSCULAR; INTRAVENOUS at 05:54

## 2017-06-13 RX ADMIN — DOCUSATE SODIUM 100 MG: 100 CAPSULE ORAL at 08:25

## 2017-06-13 RX ADMIN — DOCUSATE SODIUM 100 MG: 100 CAPSULE ORAL at 20:38

## 2017-06-13 RX ADMIN — DOXYCYCLINE 100 MG: 100 INJECTION, POWDER, LYOPHILIZED, FOR SOLUTION INTRAVENOUS at 14:33

## 2017-06-13 RX ADMIN — IOHEXOL 100 ML: 350 INJECTION, SOLUTION INTRAVENOUS at 17:46

## 2017-06-13 RX ADMIN — DOXYCYCLINE 100 MG: 100 INJECTION, POWDER, LYOPHILIZED, FOR SOLUTION INTRAVENOUS at 03:17

## 2017-06-13 RX ADMIN — SODIUM CHLORIDE 1000 ML: 9 INJECTION, SOLUTION INTRAVENOUS at 00:51

## 2017-06-13 RX ADMIN — AMPICILLIN SODIUM AND SULBACTAM SODIUM 3 G: 2; 1 INJECTION, POWDER, FOR SOLUTION INTRAMUSCULAR; INTRAVENOUS at 17:56

## 2017-06-13 RX ADMIN — AMPICILLIN SODIUM AND SULBACTAM SODIUM 3 G: 2; 1 INJECTION, POWDER, FOR SOLUTION INTRAMUSCULAR; INTRAVENOUS at 12:47

## 2017-06-13 RX ADMIN — LACTOBACILLUS ACIDOPHILUS / LACTOBACILLUS BULGARICUS 1 PACKET: 100 MILLION CFU STRENGTH GRANULES at 17:56

## 2017-06-13 RX ADMIN — AMPICILLIN SODIUM AND SULBACTAM SODIUM 3 G: 2; 1 INJECTION, POWDER, FOR SOLUTION INTRAMUSCULAR; INTRAVENOUS at 00:51

## 2017-06-13 ASSESSMENT — ENCOUNTER SYMPTOMS
COUGH: 0
SHORTNESS OF BREATH: 0
CHILLS: 0
NAUSEA: 0
BLURRED VISION: 0
HEARTBURN: 0
WHEEZING: 0
HEADACHES: 0
SORE THROAT: 0
ABDOMINAL PAIN: 0
DIARRHEA: 0
DIZZINESS: 0
FEVER: 0
VOMITING: 0

## 2017-06-13 ASSESSMENT — PAIN SCALES - GENERAL: PAINLEVEL_OUTOF10: 1

## 2017-06-13 NOTE — PROGRESS NOTES
[facility]ist Progress Note    Date of Service: 2017    Chief Complaint  36 y.o. female admitted 2017 with Cellulitis R leg, Renal Insufficiency, Sepsis.    Interval Problem Update  Cellulitis - less redness and swelling  Sepsis - wound cult showed Streptococcus grp A, BP better  Renal insuff - crea better    Consultants/Specialty  None    Disposition  for CT leg        Review of Systems   Constitutional: Negative for fever and chills.   HENT: Negative for sore throat.    Eyes: Negative for blurred vision.   Respiratory: Negative for cough, shortness of breath and wheezing.    Cardiovascular: Positive for leg swelling. Negative for chest pain.   Gastrointestinal: Negative for heartburn, nausea, vomiting, abdominal pain and diarrhea.   Genitourinary: Negative for dysuria.   Neurological: Negative for dizziness and headaches.      Physical Exam  Laboratory/Imaging   Hemodynamics  Temp (24hrs), Av.8 °C (98.3 °F), Min:36.6 °C (97.8 °F), Max:37.1 °C (98.8 °F)   Temperature: 37.1 °C (98.8 °F)  Pulse  Av  Min: 50  Max: 78    Blood Pressure: 104/68 mmHg (nurse aware)      Respiratory      Respiration: 17, Pulse Oximetry: 99 %             Fluids  No intake or output data in the 24 hours ending 17 1620    Nutrition  Orders Placed This Encounter   Procedures   • Diet Order     Standing Status: Standing      Number of Occurrences: 1      Standing Expiration Date:      Order Specific Question:  Diet:     Answer:  Regular [1]     Physical Exam   Constitutional: She is oriented to person, place, and time. She appears well-developed and well-nourished.   HENT:   Head: Normocephalic and atraumatic.   Eyes: Conjunctivae are normal. Pupils are equal, round, and reactive to light.   Neck: No tracheal deviation present. No thyromegaly present.   Cardiovascular: Normal rate and regular rhythm.    Pulmonary/Chest: Effort normal and breath sounds normal.   Abdominal: Soft. Bowel sounds are normal. She exhibits  no distension. There is no tenderness.   Musculoskeletal:   localized erythema and swelling at R leg with some fluctuance   Lymphadenopathy:     She has no cervical adenopathy.   Neurological: She is alert and oriented to person, place, and time.   Skin: There is erythema.   Nursing note and vitals reviewed.      Recent Labs      06/11/17   0810  06/12/17   0027   WBC  7.1  6.1   RBC  4.01*  3.71*   HEMOGLOBIN  13.8  12.5   HEMATOCRIT  39.6  37.0   MCV  98.8*  99.7*   MCH  34.4*  33.7*   MCHC  34.8  33.8   RDW  40.9  41.1   PLATELETCT  291  288   MPV  9.9  10.0     Recent Labs      06/11/17   0810  06/12/17   0027   SODIUM  129*  135   POTASSIUM  3.9  4.0   CHLORIDE  95*  104   CO2  25  24   GLUCOSE  92  88   BUN  12  13   CREATININE  1.18  1.03   CALCIUM  9.1  8.8                      Assessment/Plan     * Cellulitis  Assessment & Plan  IV Unasyn Doxycycline, check CT leg to r/o Abscess    Sepsis with hypotension (CMS-HCC)  Assessment & Plan  Increase IVF NS    Renal Insufficiency  Increase IVF NS    Macrocytosis  Check b12, folic, tsh    Hyponatremia  IVF NS    Labs reviewed, Medications reviewed and Radiology images reviewed  Walton catheter: No Walton      DVT Prophylaxis: Enoxaparin (Lovenox)    Ulcer prophylaxis: No  Antibiotics: Treating active infection/contamination beyond 24 hours perioperative coverage

## 2017-06-13 NOTE — PROGRESS NOTES
Report received, pt assessed. Pt up self and steady. Pt R calf cellulitis MAXI, leg elevated. Pt denies any pain. Hourly roudning in place. Pt wanting to ask MD about when she will be able to return to work, cultures, and probiotics. All needs met at this time.

## 2017-06-13 NOTE — PROGRESS NOTES
Assumed care, pt laying in bed awake alert and resp. No distress. Area to right calf red and swollen. Are marked. Call light in reach, bed in lowest and locked position. Will cont to monitor.

## 2017-06-13 NOTE — CARE PLAN
Problem: Bowel/Gastric:  Goal: Normal bowel function is maintained or improved  Outcome: PROGRESSING AS EXPECTED  Pt taking stool softeners. Pt having regular BMs and preventing constipation .    Problem: Pain Management  Goal: Pain level will decrease to patient’s comfort goal  Outcome: PROGRESSING AS EXPECTED  Pt pain well controled in leg. Pt refuses need for medication. Pt leg elevated and antibiotics continued.

## 2017-06-14 LAB
ANION GAP SERPL CALC-SCNC: 6 MMOL/L (ref 0–11.9)
ANISOCYTOSIS BLD QL SMEAR: ABNORMAL
BASOPHILS # BLD AUTO: 2.6 % (ref 0–1.8)
BASOPHILS # BLD: 0.18 K/UL (ref 0–0.12)
BUN SERPL-MCNC: 9 MG/DL (ref 8–22)
CALCIUM SERPL-MCNC: 8.4 MG/DL (ref 8.5–10.5)
CHLORIDE SERPL-SCNC: 106 MMOL/L (ref 96–112)
CO2 SERPL-SCNC: 25 MMOL/L (ref 20–33)
CREAT SERPL-MCNC: 0.85 MG/DL (ref 0.5–1.4)
EOSINOPHIL # BLD AUTO: 0.24 K/UL (ref 0–0.51)
EOSINOPHIL NFR BLD: 3.4 % (ref 0–6.9)
ERYTHROCYTE [DISTWIDTH] IN BLOOD BY AUTOMATED COUNT: 41.3 FL (ref 35.9–50)
FOLATE SERPL-MCNC: 21.8 NG/ML
GFR SERPL CREATININE-BSD FRML MDRD: >60 ML/MIN/1.73 M 2
GLUCOSE SERPL-MCNC: 86 MG/DL (ref 65–99)
HCT VFR BLD AUTO: 35.2 % (ref 37–47)
HGB BLD-MCNC: 11.8 G/DL (ref 12–16)
LYMPHOCYTES # BLD AUTO: 1.84 K/UL (ref 1–4.8)
LYMPHOCYTES NFR BLD: 25.9 % (ref 22–41)
MACROCYTES BLD QL SMEAR: ABNORMAL
MANUAL DIFF BLD: NORMAL
MCH RBC QN AUTO: 33.6 PG (ref 27–33)
MCHC RBC AUTO-ENTMCNC: 33.5 G/DL (ref 33.6–35)
MCV RBC AUTO: 100.3 FL (ref 81.4–97.8)
MONOCYTES # BLD AUTO: 0.12 K/UL (ref 0–0.85)
MONOCYTES NFR BLD AUTO: 1.7 % (ref 0–13.4)
MORPHOLOGY BLD-IMP: NORMAL
NEUTROPHILS # BLD AUTO: 4.71 K/UL (ref 2–7.15)
NEUTROPHILS NFR BLD: 66.4 % (ref 44–72)
NRBC # BLD AUTO: 0 K/UL
NRBC BLD AUTO-RTO: 0 /100 WBC
PLATELET # BLD AUTO: 291 K/UL (ref 164–446)
PLATELET BLD QL SMEAR: NORMAL
PMV BLD AUTO: 10.1 FL (ref 9–12.9)
POTASSIUM SERPL-SCNC: 3.8 MMOL/L (ref 3.6–5.5)
RBC # BLD AUTO: 3.51 M/UL (ref 4.2–5.4)
RBC BLD AUTO: PRESENT
SODIUM SERPL-SCNC: 137 MMOL/L (ref 135–145)
TSH SERPL DL<=0.005 MIU/L-ACNC: 2.84 UIU/ML (ref 0.3–3.7)
VIT B12 SERPL-MCNC: 1181 PG/ML (ref 211–911)
WBC # BLD AUTO: 7.1 K/UL (ref 4.8–10.8)

## 2017-06-14 PROCEDURE — 700111 HCHG RX REV CODE 636 W/ 250 OVERRIDE (IP): Performed by: INTERNAL MEDICINE

## 2017-06-14 PROCEDURE — 80048 BASIC METABOLIC PNL TOTAL CA: CPT

## 2017-06-14 PROCEDURE — A9270 NON-COVERED ITEM OR SERVICE: HCPCS | Performed by: INTERNAL MEDICINE

## 2017-06-14 PROCEDURE — A9270 NON-COVERED ITEM OR SERVICE: HCPCS | Performed by: PHARMACIST

## 2017-06-14 PROCEDURE — 82746 ASSAY OF FOLIC ACID SERUM: CPT

## 2017-06-14 PROCEDURE — 700102 HCHG RX REV CODE 250 W/ 637 OVERRIDE(OP): Performed by: PHARMACIST

## 2017-06-14 PROCEDURE — 770006 HCHG ROOM/CARE - MED/SURG/GYN SEMI*

## 2017-06-14 PROCEDURE — 99233 SBSQ HOSP IP/OBS HIGH 50: CPT | Performed by: FAMILY MEDICINE

## 2017-06-14 PROCEDURE — 85027 COMPLETE CBC AUTOMATED: CPT

## 2017-06-14 PROCEDURE — 84443 ASSAY THYROID STIM HORMONE: CPT

## 2017-06-14 PROCEDURE — 700105 HCHG RX REV CODE 258: Performed by: INTERNAL MEDICINE

## 2017-06-14 PROCEDURE — A9270 NON-COVERED ITEM OR SERVICE: HCPCS | Performed by: FAMILY MEDICINE

## 2017-06-14 PROCEDURE — 700112 HCHG RX REV CODE 229: Performed by: INTERNAL MEDICINE

## 2017-06-14 PROCEDURE — 85007 BL SMEAR W/DIFF WBC COUNT: CPT

## 2017-06-14 PROCEDURE — 82607 VITAMIN B-12: CPT

## 2017-06-14 PROCEDURE — 700102 HCHG RX REV CODE 250 W/ 637 OVERRIDE(OP): Performed by: FAMILY MEDICINE

## 2017-06-14 PROCEDURE — 36415 COLL VENOUS BLD VENIPUNCTURE: CPT

## 2017-06-14 RX ORDER — DOXYCYCLINE 100 MG/1
TABLET ORAL
Status: ACTIVE
Start: 2017-06-14 | End: 2017-06-14

## 2017-06-14 RX ADMIN — DOCUSATE SODIUM 100 MG: 100 CAPSULE ORAL at 08:36

## 2017-06-14 RX ADMIN — LACTOBACILLUS ACIDOPHILUS / LACTOBACILLUS BULGARICUS 1 PACKET: 100 MILLION CFU STRENGTH GRANULES at 08:36

## 2017-06-14 RX ADMIN — DOCUSATE SODIUM 100 MG: 100 CAPSULE ORAL at 20:44

## 2017-06-14 RX ADMIN — DOXYCYCLINE 100 MG: 100 TABLET ORAL at 06:30

## 2017-06-14 RX ADMIN — DOXYCYCLINE 100 MG: 100 TABLET ORAL at 20:44

## 2017-06-14 RX ADMIN — AMPICILLIN SODIUM AND SULBACTAM SODIUM 3 G: 2; 1 INJECTION, POWDER, FOR SOLUTION INTRAMUSCULAR; INTRAVENOUS at 00:41

## 2017-06-14 RX ADMIN — LACTOBACILLUS ACIDOPHILUS / LACTOBACILLUS BULGARICUS 1 PACKET: 100 MILLION CFU STRENGTH GRANULES at 17:46

## 2017-06-14 RX ADMIN — AMPICILLIN SODIUM AND SULBACTAM SODIUM 3 G: 2; 1 INJECTION, POWDER, FOR SOLUTION INTRAMUSCULAR; INTRAVENOUS at 17:45

## 2017-06-14 RX ADMIN — AMPICILLIN SODIUM AND SULBACTAM SODIUM 3 G: 2; 1 INJECTION, POWDER, FOR SOLUTION INTRAMUSCULAR; INTRAVENOUS at 11:44

## 2017-06-14 RX ADMIN — AMPICILLIN SODIUM AND SULBACTAM SODIUM 3 G: 2; 1 INJECTION, POWDER, FOR SOLUTION INTRAMUSCULAR; INTRAVENOUS at 06:13

## 2017-06-14 ASSESSMENT — ENCOUNTER SYMPTOMS
SHORTNESS OF BREATH: 0
FEVER: 0
BLURRED VISION: 0
WHEEZING: 0
HEARTBURN: 0
NAUSEA: 0
ABDOMINAL PAIN: 0
HEADACHES: 0
COUGH: 0
DIZZINESS: 0
DIARRHEA: 0
CHILLS: 0
VOMITING: 0
SORE THROAT: 0

## 2017-06-14 ASSESSMENT — PAIN SCALES - GENERAL: PAINLEVEL_OUTOF10: 1

## 2017-06-14 NOTE — PROGRESS NOTES
Assumed care, pt laying in bed awake alert and resp. No distress, no c/o pain. Area to right calf remains marked. Redness and swelling persists. Appears in good spirits. call light in place, bed in lowest and locked position.

## 2017-06-14 NOTE — PROGRESS NOTES
Report received, pt assessed. Pt leg elevated on pillow, red, edematous. Pt pain well controlled, denies need for intervention. Pt denies any needs at this time. Hourly rounding in place.

## 2017-06-14 NOTE — CARE PLAN
Problem: Venous Thromboembolism (VTW)/Deep Vein Thrombosis (DVT) Prevention:  Goal: Patient will participate in Venous Thrombosis (VTE)/Deep Vein Thrombosis (DVT)Prevention Measures  Outcome: PROGRESSING AS EXPECTED  Pt refusing Lovenox but understands importance of ambulation. Pt goal is to ambulate throughout day and understands importance to help prevent DVT.     Problem: Bowel/Gastric:  Goal: Normal bowel function is maintained or improved  Outcome: PROGRESSING AS EXPECTED  Pt having regular BMs. Taking stool softeners and started on probiotic. Pt understands importance of precenting constipation.

## 2017-06-14 NOTE — PROGRESS NOTES
[facility] Hospitalist Progress Note    Date of Service: 2017    Chief Complaint  36 y.o. female admitted 2017 with Cellulitis R leg, Renal Insufficiency, Sepsis.    Interval Problem Update  Cellulitis - less redness and swelling, CT and US neg for abscess  Sepsis - wound cult showed Streptococcus grp A, BP better  Renal insuff - crea better    Consultants/Specialty  None    Disposition  Possible discharge home in 2-3 days        Review of Systems   Constitutional: Negative for fever and chills.   HENT: Negative for sore throat.    Eyes: Negative for blurred vision.   Respiratory: Negative for cough, shortness of breath and wheezing.    Cardiovascular: Positive for leg swelling. Negative for chest pain.   Gastrointestinal: Negative for heartburn, nausea, vomiting, abdominal pain and diarrhea.   Genitourinary: Negative for dysuria.   Neurological: Negative for dizziness and headaches.      Physical Exam  Laboratory/Imaging   Hemodynamics  Temp (24hrs), Av.9 °C (98.4 °F), Min:36.6 °C (97.9 °F), Max:37.2 °C (99 °F)   Temperature: 36.6 °C (97.9 °F)  Pulse  Av.8  Min: 50  Max: 78    Blood Pressure: 107/70 mmHg      Respiratory      Respiration: 14, Pulse Oximetry: 98 %             Fluids    Intake/Output Summary (Last 24 hours) at 17 1348  Last data filed at 17 1910   Gross per 24 hour   Intake   2160 ml   Output      0 ml   Net   2160 ml       Nutrition  Orders Placed This Encounter   Procedures   • Diet Order     Standing Status: Standing      Number of Occurrences: 1      Standing Expiration Date:      Order Specific Question:  Diet:     Answer:  Regular [1]     Physical Exam   Constitutional: She is oriented to person, place, and time. She appears well-developed and well-nourished.   HENT:   Head: Normocephalic and atraumatic.   Eyes: Conjunctivae are normal. Pupils are equal, round, and reactive to light.   Neck: No tracheal deviation present. No thyromegaly present.   Cardiovascular:  Normal rate and regular rhythm.    Pulmonary/Chest: Effort normal and breath sounds normal.   Abdominal: Soft. Bowel sounds are normal. She exhibits no distension. There is no tenderness.   Musculoskeletal:   localized erythema and swelling at R leg with some fluctuance   Lymphadenopathy:     She has no cervical adenopathy.   Neurological: She is alert and oriented to person, place, and time.   Skin: There is erythema.   Nursing note and vitals reviewed.      Recent Labs      06/12/17 0027 06/14/17 0145   WBC  6.1  7.1   RBC  3.71*  3.51*   HEMOGLOBIN  12.5  11.8*   HEMATOCRIT  37.0  35.2*   MCV  99.7*  100.3*   MCH  33.7*  33.6*   MCHC  33.8  33.5*   RDW  41.1  41.3   PLATELETCT  288  291   MPV  10.0  10.1     Recent Labs      06/12/17 0027 06/14/17 0145   SODIUM  135  137   POTASSIUM  4.0  3.8   CHLORIDE  104  106   CO2  24  25   GLUCOSE  88  86   BUN  13  9   CREATININE  1.03  0.85   CALCIUM  8.8  8.4*                      Assessment/Plan     * Cellulitis  Assessment & Plan  IV Unasyn, oral Doxycycline    Sepsis with hypotension (CMS-HCC)  Assessment & Plan  stop IVF NS    Renal Insufficiency  stop IVF NS, follow bmp    Macrocytosis  b12, folic, tsh wnl    Hyponatremia  stop IVF NS, follow bmp    Labs reviewed, Medications reviewed and Radiology images reviewed  Walton catheter: No Walton      DVT Prophylaxis: Enoxaparin (Lovenox)    Ulcer prophylaxis: No  Antibiotics: Treating active infection/contamination beyond 24 hours perioperative coverage

## 2017-06-14 NOTE — PROGRESS NOTES
Consent signed for IV contrast for CT. Pt down in wheelchair with transport to CT. All questions answered.

## 2017-06-15 LAB
ANION GAP SERPL CALC-SCNC: 7 MMOL/L (ref 0–11.9)
BASOPHILS # BLD AUTO: 0.7 % (ref 0–1.8)
BASOPHILS # BLD: 0.05 K/UL (ref 0–0.12)
BUN SERPL-MCNC: 9 MG/DL (ref 8–22)
CALCIUM SERPL-MCNC: 8.8 MG/DL (ref 8.5–10.5)
CHLORIDE SERPL-SCNC: 103 MMOL/L (ref 96–112)
CO2 SERPL-SCNC: 26 MMOL/L (ref 20–33)
CREAT SERPL-MCNC: 0.88 MG/DL (ref 0.5–1.4)
EOSINOPHIL # BLD AUTO: 0.24 K/UL (ref 0–0.51)
EOSINOPHIL NFR BLD: 3.4 % (ref 0–6.9)
ERYTHROCYTE [DISTWIDTH] IN BLOOD BY AUTOMATED COUNT: 40.7 FL (ref 35.9–50)
GFR SERPL CREATININE-BSD FRML MDRD: >60 ML/MIN/1.73 M 2
GLUCOSE SERPL-MCNC: 87 MG/DL (ref 65–99)
HCT VFR BLD AUTO: 36.8 % (ref 37–47)
HGB BLD-MCNC: 12.6 G/DL (ref 12–16)
IMM GRANULOCYTES # BLD AUTO: 0.05 K/UL (ref 0–0.11)
IMM GRANULOCYTES NFR BLD AUTO: 0.7 % (ref 0–0.9)
LYMPHOCYTES # BLD AUTO: 2.46 K/UL (ref 1–4.8)
LYMPHOCYTES NFR BLD: 34.7 % (ref 22–41)
MCH RBC QN AUTO: 34.1 PG (ref 27–33)
MCHC RBC AUTO-ENTMCNC: 34.2 G/DL (ref 33.6–35)
MCV RBC AUTO: 99.7 FL (ref 81.4–97.8)
MONOCYTES # BLD AUTO: 0.4 K/UL (ref 0–0.85)
MONOCYTES NFR BLD AUTO: 5.6 % (ref 0–13.4)
NEUTROPHILS # BLD AUTO: 3.88 K/UL (ref 2–7.15)
NEUTROPHILS NFR BLD: 54.9 % (ref 44–72)
NRBC # BLD AUTO: 0 K/UL
NRBC BLD AUTO-RTO: 0 /100 WBC
PLATELET # BLD AUTO: 317 K/UL (ref 164–446)
PMV BLD AUTO: 9.6 FL (ref 9–12.9)
POTASSIUM SERPL-SCNC: 3.8 MMOL/L (ref 3.6–5.5)
RBC # BLD AUTO: 3.69 M/UL (ref 4.2–5.4)
SODIUM SERPL-SCNC: 136 MMOL/L (ref 135–145)
WBC # BLD AUTO: 7.1 K/UL (ref 4.8–10.8)

## 2017-06-15 PROCEDURE — 700112 HCHG RX REV CODE 229: Performed by: INTERNAL MEDICINE

## 2017-06-15 PROCEDURE — A9270 NON-COVERED ITEM OR SERVICE: HCPCS | Performed by: PHARMACIST

## 2017-06-15 PROCEDURE — A9270 NON-COVERED ITEM OR SERVICE: HCPCS | Performed by: INTERNAL MEDICINE

## 2017-06-15 PROCEDURE — 99232 SBSQ HOSP IP/OBS MODERATE 35: CPT | Performed by: FAMILY MEDICINE

## 2017-06-15 PROCEDURE — 700105 HCHG RX REV CODE 258: Performed by: INTERNAL MEDICINE

## 2017-06-15 PROCEDURE — 770006 HCHG ROOM/CARE - MED/SURG/GYN SEMI*

## 2017-06-15 PROCEDURE — 85025 COMPLETE CBC W/AUTO DIFF WBC: CPT

## 2017-06-15 PROCEDURE — 36415 COLL VENOUS BLD VENIPUNCTURE: CPT

## 2017-06-15 PROCEDURE — 80048 BASIC METABOLIC PNL TOTAL CA: CPT

## 2017-06-15 PROCEDURE — 700102 HCHG RX REV CODE 250 W/ 637 OVERRIDE(OP): Performed by: FAMILY MEDICINE

## 2017-06-15 PROCEDURE — 700111 HCHG RX REV CODE 636 W/ 250 OVERRIDE (IP): Performed by: INTERNAL MEDICINE

## 2017-06-15 PROCEDURE — A9270 NON-COVERED ITEM OR SERVICE: HCPCS | Performed by: FAMILY MEDICINE

## 2017-06-15 PROCEDURE — 700102 HCHG RX REV CODE 250 W/ 637 OVERRIDE(OP): Performed by: PHARMACIST

## 2017-06-15 RX ADMIN — DOCUSATE SODIUM 100 MG: 100 CAPSULE ORAL at 08:23

## 2017-06-15 RX ADMIN — AMPICILLIN SODIUM AND SULBACTAM SODIUM 3 G: 2; 1 INJECTION, POWDER, FOR SOLUTION INTRAMUSCULAR; INTRAVENOUS at 11:57

## 2017-06-15 RX ADMIN — DOCUSATE SODIUM 100 MG: 100 CAPSULE ORAL at 21:59

## 2017-06-15 RX ADMIN — AMPICILLIN SODIUM AND SULBACTAM SODIUM 3 G: 2; 1 INJECTION, POWDER, FOR SOLUTION INTRAMUSCULAR; INTRAVENOUS at 17:46

## 2017-06-15 RX ADMIN — DOXYCYCLINE 100 MG: 100 TABLET ORAL at 08:23

## 2017-06-15 RX ADMIN — LACTOBACILLUS ACIDOPHILUS / LACTOBACILLUS BULGARICUS 1 PACKET: 100 MILLION CFU STRENGTH GRANULES at 17:46

## 2017-06-15 RX ADMIN — AMPICILLIN SODIUM AND SULBACTAM SODIUM 3 G: 2; 1 INJECTION, POWDER, FOR SOLUTION INTRAMUSCULAR; INTRAVENOUS at 06:03

## 2017-06-15 RX ADMIN — LACTOBACILLUS ACIDOPHILUS / LACTOBACILLUS BULGARICUS 1 PACKET: 100 MILLION CFU STRENGTH GRANULES at 08:23

## 2017-06-15 RX ADMIN — DOXYCYCLINE 100 MG: 100 TABLET ORAL at 22:00

## 2017-06-15 RX ADMIN — AMPICILLIN SODIUM AND SULBACTAM SODIUM 3 G: 2; 1 INJECTION, POWDER, FOR SOLUTION INTRAMUSCULAR; INTRAVENOUS at 23:46

## 2017-06-15 RX ADMIN — AMPICILLIN SODIUM AND SULBACTAM SODIUM 3 G: 2; 1 INJECTION, POWDER, FOR SOLUTION INTRAMUSCULAR; INTRAVENOUS at 00:59

## 2017-06-15 ASSESSMENT — ENCOUNTER SYMPTOMS
DIZZINESS: 0
HEADACHES: 0
CHILLS: 0
BLURRED VISION: 0
NAUSEA: 0
HEARTBURN: 0
DIARRHEA: 0
SHORTNESS OF BREATH: 0
SORE THROAT: 0
WHEEZING: 0
FEVER: 0
ABDOMINAL PAIN: 0
COUGH: 0
VOMITING: 0

## 2017-06-15 ASSESSMENT — PAIN SCALES - GENERAL
PAINLEVEL_OUTOF10: 1
PAINLEVEL_OUTOF10: 1

## 2017-06-15 NOTE — PROGRESS NOTES
Pt a+ox4, no complaints of pain. Pt refusing lovenox- education provided; pt ambulating . Leg elevated- pt reporting less redness and swelling; remains on iv abx.  All needs met at this time.

## 2017-06-15 NOTE — CARE PLAN
Problem: Infection  Goal: Will remain free from infection  Intervention: Assess signs and symptoms of infection  Pt reporting less redness and swelling, afebrile. Remains on iv abx

## 2017-06-15 NOTE — PROGRESS NOTES
[facility]ist Progress Note    Date of Service: 6/15/2017    Chief Complaint  36 y.o. female admitted 2017 with Cellulitis R leg, Renal Insufficiency, Sepsis.    Interval Problem Update  Cellulitis - less redness and swelling, CT and US neg for abscess  Sepsis - wound cult showed Streptococcus grp A  Renal insuff - crea better    Consultants/Specialty  None    Disposition  possible discharge home in 1-2 days        Review of Systems   Constitutional: Negative for fever and chills.   HENT: Negative for sore throat.    Eyes: Negative for blurred vision.   Respiratory: Negative for cough, shortness of breath and wheezing.    Cardiovascular: Positive for leg swelling. Negative for chest pain.   Gastrointestinal: Negative for heartburn, nausea, vomiting, abdominal pain and diarrhea.   Genitourinary: Negative for dysuria.   Neurological: Negative for dizziness and headaches.      Physical Exam  Laboratory/Imaging   Hemodynamics  Temp (24hrs), Av.6 °C (97.8 °F), Min:36.3 °C (97.3 °F), Max:36.7 °C (98.1 °F)   Temperature: 36.7 °C (98.1 °F)  Pulse  Av  Min: 50  Max: 78    Blood Pressure: 108/70 mmHg      Respiratory      Respiration: 14, Pulse Oximetry: 98 %             Fluids    Intake/Output Summary (Last 24 hours) at 06/15/17 1450  Last data filed at 06/15/17 1118   Gross per 24 hour   Intake   1470 ml   Output      0 ml   Net   1470 ml       Nutrition  Orders Placed This Encounter   Procedures   • Diet Order     Standing Status: Standing      Number of Occurrences: 1      Standing Expiration Date:      Order Specific Question:  Diet:     Answer:  Regular [1]     Physical Exam   Constitutional: She is oriented to person, place, and time. She appears well-developed and well-nourished.   HENT:   Head: Normocephalic and atraumatic.   Eyes: Conjunctivae are normal. Pupils are equal, round, and reactive to light.   Neck: No tracheal deviation present. No thyromegaly present.   Cardiovascular: Normal rate and  regular rhythm.    Pulmonary/Chest: Effort normal and breath sounds normal.   Abdominal: Soft. Bowel sounds are normal. She exhibits no distension. There is no tenderness.   Musculoskeletal:   localized erythema and swelling at R leg with some fluctuance   Lymphadenopathy:     She has no cervical adenopathy.   Neurological: She is alert and oriented to person, place, and time.   Skin: There is erythema.   Nursing note and vitals reviewed.      Recent Labs      06/14/17   0145  06/15/17   0323   WBC  7.1  7.1   RBC  3.51*  3.69*   HEMOGLOBIN  11.8*  12.6   HEMATOCRIT  35.2*  36.8*   MCV  100.3*  99.7*   MCH  33.6*  34.1*   MCHC  33.5*  34.2   RDW  41.3  40.7   PLATELETCT  291  317   MPV  10.1  9.6     Recent Labs      06/14/17   0145  06/15/17   0323   SODIUM  137  136   POTASSIUM  3.8  3.8   CHLORIDE  106  103   CO2  25  26   GLUCOSE  86  87   BUN  9  9   CREATININE  0.85  0.88   CALCIUM  8.4*  8.8                      Assessment/Plan     * Cellulitis  Assessment & Plan  IV Unasyn, oral Doxycycline    Sepsis with hypotension (CMS-[facility])  Assessment & Plan  off IVF    Renal Insufficiency  off IVF    Macrocytosis  b12, folic, tsh wnl    Hyponatremia  follow bmp    Labs reviewed, Medications reviewed and Radiology images reviewed  Walton catheter: No Walton      DVT Prophylaxis: Enoxaparin (Lovenox)    Ulcer prophylaxis: No  Antibiotics: Treating active infection/contamination beyond 24 hours perioperative coverage

## 2017-06-16 LAB
ANION GAP SERPL CALC-SCNC: 8 MMOL/L (ref 0–11.9)
BASOPHILS # BLD AUTO: 0.7 % (ref 0–1.8)
BASOPHILS # BLD: 0.05 K/UL (ref 0–0.12)
BUN SERPL-MCNC: 11 MG/DL (ref 8–22)
CALCIUM SERPL-MCNC: 9.1 MG/DL (ref 8.5–10.5)
CHLORIDE SERPL-SCNC: 103 MMOL/L (ref 96–112)
CO2 SERPL-SCNC: 26 MMOL/L (ref 20–33)
CREAT SERPL-MCNC: 0.98 MG/DL (ref 0.5–1.4)
EOSINOPHIL # BLD AUTO: 0.19 K/UL (ref 0–0.51)
EOSINOPHIL NFR BLD: 2.7 % (ref 0–6.9)
ERYTHROCYTE [DISTWIDTH] IN BLOOD BY AUTOMATED COUNT: 39.9 FL (ref 35.9–50)
GFR SERPL CREATININE-BSD FRML MDRD: >60 ML/MIN/1.73 M 2
GLUCOSE SERPL-MCNC: 85 MG/DL (ref 65–99)
HCT VFR BLD AUTO: 39.7 % (ref 37–47)
HGB BLD-MCNC: 13.5 G/DL (ref 12–16)
IMM GRANULOCYTES # BLD AUTO: 0.05 K/UL (ref 0–0.11)
IMM GRANULOCYTES NFR BLD AUTO: 0.7 % (ref 0–0.9)
LYMPHOCYTES # BLD AUTO: 2.57 K/UL (ref 1–4.8)
LYMPHOCYTES NFR BLD: 36.2 % (ref 22–41)
MCH RBC QN AUTO: 33.6 PG (ref 27–33)
MCHC RBC AUTO-ENTMCNC: 34 G/DL (ref 33.6–35)
MCV RBC AUTO: 98.8 FL (ref 81.4–97.8)
MONOCYTES # BLD AUTO: 0.43 K/UL (ref 0–0.85)
MONOCYTES NFR BLD AUTO: 6.1 % (ref 0–13.4)
NEUTROPHILS # BLD AUTO: 3.81 K/UL (ref 2–7.15)
NEUTROPHILS NFR BLD: 53.6 % (ref 44–72)
NRBC # BLD AUTO: 0 K/UL
NRBC BLD AUTO-RTO: 0 /100 WBC
PLATELET # BLD AUTO: 348 K/UL (ref 164–446)
PMV BLD AUTO: 9.6 FL (ref 9–12.9)
POTASSIUM SERPL-SCNC: 3.7 MMOL/L (ref 3.6–5.5)
RBC # BLD AUTO: 4.02 M/UL (ref 4.2–5.4)
SODIUM SERPL-SCNC: 137 MMOL/L (ref 135–145)
WBC # BLD AUTO: 7.1 K/UL (ref 4.8–10.8)

## 2017-06-16 PROCEDURE — 700102 HCHG RX REV CODE 250 W/ 637 OVERRIDE(OP): Performed by: FAMILY MEDICINE

## 2017-06-16 PROCEDURE — 700102 HCHG RX REV CODE 250 W/ 637 OVERRIDE(OP): Performed by: PHARMACIST

## 2017-06-16 PROCEDURE — A9270 NON-COVERED ITEM OR SERVICE: HCPCS | Performed by: FAMILY MEDICINE

## 2017-06-16 PROCEDURE — 700111 HCHG RX REV CODE 636 W/ 250 OVERRIDE (IP): Performed by: INTERNAL MEDICINE

## 2017-06-16 PROCEDURE — 700112 HCHG RX REV CODE 229: Performed by: INTERNAL MEDICINE

## 2017-06-16 PROCEDURE — 36415 COLL VENOUS BLD VENIPUNCTURE: CPT

## 2017-06-16 PROCEDURE — 700105 HCHG RX REV CODE 258: Performed by: INTERNAL MEDICINE

## 2017-06-16 PROCEDURE — A9270 NON-COVERED ITEM OR SERVICE: HCPCS | Performed by: PHARMACIST

## 2017-06-16 PROCEDURE — 99232 SBSQ HOSP IP/OBS MODERATE 35: CPT | Performed by: FAMILY MEDICINE

## 2017-06-16 PROCEDURE — 770006 HCHG ROOM/CARE - MED/SURG/GYN SEMI*

## 2017-06-16 PROCEDURE — 85025 COMPLETE CBC W/AUTO DIFF WBC: CPT

## 2017-06-16 PROCEDURE — A9270 NON-COVERED ITEM OR SERVICE: HCPCS | Performed by: INTERNAL MEDICINE

## 2017-06-16 PROCEDURE — 80048 BASIC METABOLIC PNL TOTAL CA: CPT

## 2017-06-16 RX ADMIN — DOCUSATE SODIUM 100 MG: 100 CAPSULE ORAL at 08:18

## 2017-06-16 RX ADMIN — DOXYCYCLINE 100 MG: 100 TABLET ORAL at 19:47

## 2017-06-16 RX ADMIN — DOXYCYCLINE 100 MG: 100 TABLET ORAL at 08:18

## 2017-06-16 RX ADMIN — AMPICILLIN SODIUM AND SULBACTAM SODIUM 3 G: 2; 1 INJECTION, POWDER, FOR SOLUTION INTRAMUSCULAR; INTRAVENOUS at 11:30

## 2017-06-16 RX ADMIN — AMPICILLIN SODIUM AND SULBACTAM SODIUM 3 G: 2; 1 INJECTION, POWDER, FOR SOLUTION INTRAMUSCULAR; INTRAVENOUS at 04:38

## 2017-06-16 RX ADMIN — DOCUSATE SODIUM 100 MG: 100 CAPSULE ORAL at 19:47

## 2017-06-16 RX ADMIN — LACTOBACILLUS ACIDOPHILUS / LACTOBACILLUS BULGARICUS 1 PACKET: 100 MILLION CFU STRENGTH GRANULES at 08:18

## 2017-06-16 RX ADMIN — AMPICILLIN SODIUM AND SULBACTAM SODIUM 3 G: 2; 1 INJECTION, POWDER, FOR SOLUTION INTRAMUSCULAR; INTRAVENOUS at 17:18

## 2017-06-16 RX ADMIN — LACTOBACILLUS ACIDOPHILUS / LACTOBACILLUS BULGARICUS 1 PACKET: 100 MILLION CFU STRENGTH GRANULES at 17:18

## 2017-06-16 ASSESSMENT — ENCOUNTER SYMPTOMS
DIARRHEA: 0
FEVER: 0
VOMITING: 0
ABDOMINAL PAIN: 0
WHEEZING: 0
SHORTNESS OF BREATH: 0
HEADACHES: 0
SORE THROAT: 0
DIZZINESS: 0
BLURRED VISION: 0
NAUSEA: 0
CHILLS: 0
COUGH: 0
HEARTBURN: 0

## 2017-06-16 ASSESSMENT — PAIN SCALES - GENERAL
PAINLEVEL_OUTOF10: 0
PAINLEVEL_OUTOF10: 0

## 2017-06-16 NOTE — PROGRESS NOTES
Patient alert and oriented, vss, denies pain. Patient up independently in room. Not fall risk at this time. Patient R leg still with mild swelling and redness from cellulitis. Patient continues on IV antibiotics. Patient without complaints. Will monitor.

## 2017-06-16 NOTE — PROGRESS NOTES
[facility]    Date of Service: 2017    Chief Complaint  36 y.o. female admitted 2017 with Cellulitis R leg, Renal Insufficiency, Sepsis.    Interval Problem Update  Cellulitis - less redness and swelling, CT and US neg for abscess  Sepsis - wound cult showed Streptococcus grp A  Renal insuff - crea better    Consultants/Specialty  None    Disposition  possible discharge home eb        Review of Systems   Constitutional: Negative for fever and chills.   HENT: Negative for sore throat.    Eyes: Negative for blurred vision.   Respiratory: Negative for cough, shortness of breath and wheezing.    Cardiovascular: Positive for leg swelling. Negative for chest pain.   Gastrointestinal: Negative for heartburn, nausea, vomiting, abdominal pain and diarrhea.   Genitourinary: Negative for dysuria.   Neurological: Negative for dizziness and headaches.      Physical Exam  Laboratory/Imaging   Hemodynamics  Temp (24hrs), Av.5 °C (97.7 °F), Min:36.3 °C (97.4 °F), Max:36.7 °C (98.1 °F)   Temperature: 36.3 °C (97.4 °F)  Pulse  Av.1  Min: 49  Max: 78    Blood Pressure: 104/64 mmHg      Respiratory      Respiration: 16, Pulse Oximetry: 94 %             Fluids    Intake/Output Summary (Last 24 hours) at 17 1432  Last data filed at 17 0500   Gross per 24 hour   Intake    540 ml   Output      0 ml   Net    540 ml       Nutrition  Orders Placed This Encounter   Procedures   • Diet Order     Standing Status: Standing      Number of Occurrences: 1      Standing Expiration Date:      Order Specific Question:  Diet:     Answer:  Regular [1]     Physical Exam   Constitutional: She is oriented to person, place, and time. She appears well-developed and well-nourished.   HENT:   Head: Normocephalic and atraumatic.   Eyes: Conjunctivae are normal. Pupils are equal, round, and reactive to light.   Neck: No tracheal deviation present. No thyromegaly present.   Cardiovascular: Normal rate and  regular rhythm.    Pulmonary/Chest: Effort normal and breath sounds normal.   Abdominal: Soft. Bowel sounds are normal. She exhibits no distension. There is no tenderness.   Musculoskeletal:   localized erythema and swelling at R leg with some fluctuance   Lymphadenopathy:     She has no cervical adenopathy.   Neurological: She is alert and oriented to person, place, and time.   Skin: There is erythema.   Nursing note and vitals reviewed.      Recent Labs      06/14/17   0145  06/15/17   0323  06/16/17   0329   WBC  7.1  7.1  7.1   RBC  3.51*  3.69*  4.02*   HEMOGLOBIN  11.8*  12.6  13.5   HEMATOCRIT  35.2*  36.8*  39.7   MCV  100.3*  99.7*  98.8*   MCH  33.6*  34.1*  33.6*   MCHC  33.5*  34.2  34.0   RDW  41.3  40.7  39.9   PLATELETCT  291  317  348   MPV  10.1  9.6  9.6     Recent Labs      06/14/17   0145  06/15/17   0323  06/16/17   0329   SODIUM  137  136  137   POTASSIUM  3.8  3.8  3.7   CHLORIDE  106  103  103   CO2  25  26  26   GLUCOSE  86  87  85   BUN  9  9  11   CREATININE  0.85  0.88  0.98   CALCIUM  8.4*  8.8  9.1                      Assessment/Plan     * Cellulitis  Assessment & Plan  IV Unasyn, oral Doxycycline    Sepsis with hypotension (CMS-HCC)  Assessment & Plan  off IVF    Renal Insufficiency  off IVF    Macrocytosis  b12, folic, tsh wnl    Hyponatremia  follow bmp    Labs reviewed, Medications reviewed and Radiology images reviewed  Walton catheter: No Walton      DVT Prophylaxis: Enoxaparin (Lovenox)    Ulcer prophylaxis: No  Antibiotics: Treating active infection/contamination beyond 24 hours perioperative coverage

## 2017-06-17 VITALS
OXYGEN SATURATION: 94 % | WEIGHT: 125.88 LBS | RESPIRATION RATE: 16 BRPM | BODY MASS INDEX: 20.95 KG/M2 | HEART RATE: 56 BPM | SYSTOLIC BLOOD PRESSURE: 101 MMHG | DIASTOLIC BLOOD PRESSURE: 80 MMHG | TEMPERATURE: 98.5 F

## 2017-06-17 LAB
ANION GAP SERPL CALC-SCNC: 7 MMOL/L (ref 0–11.9)
BACTERIA BLD CULT: NORMAL
BACTERIA BLD CULT: NORMAL
BASOPHILS # BLD AUTO: 1 % (ref 0–1.8)
BASOPHILS # BLD: 0.07 K/UL (ref 0–0.12)
BUN SERPL-MCNC: 16 MG/DL (ref 8–22)
CALCIUM SERPL-MCNC: 9 MG/DL (ref 8.5–10.5)
CHLORIDE SERPL-SCNC: 104 MMOL/L (ref 96–112)
CO2 SERPL-SCNC: 26 MMOL/L (ref 20–33)
CREAT SERPL-MCNC: 0.96 MG/DL (ref 0.5–1.4)
EOSINOPHIL # BLD AUTO: 0.22 K/UL (ref 0–0.51)
EOSINOPHIL NFR BLD: 3 % (ref 0–6.9)
ERYTHROCYTE [DISTWIDTH] IN BLOOD BY AUTOMATED COUNT: 41 FL (ref 35.9–50)
GFR SERPL CREATININE-BSD FRML MDRD: >60 ML/MIN/1.73 M 2
GLUCOSE SERPL-MCNC: 83 MG/DL (ref 65–99)
HCT VFR BLD AUTO: 41 % (ref 37–47)
HGB BLD-MCNC: 14.1 G/DL (ref 12–16)
IMM GRANULOCYTES # BLD AUTO: 0.08 K/UL (ref 0–0.11)
IMM GRANULOCYTES NFR BLD AUTO: 1.1 % (ref 0–0.9)
LYMPHOCYTES # BLD AUTO: 2.59 K/UL (ref 1–4.8)
LYMPHOCYTES NFR BLD: 35.8 % (ref 22–41)
MCH RBC QN AUTO: 33.9 PG (ref 27–33)
MCHC RBC AUTO-ENTMCNC: 34.4 G/DL (ref 33.6–35)
MCV RBC AUTO: 98.6 FL (ref 81.4–97.8)
MONOCYTES # BLD AUTO: 0.45 K/UL (ref 0–0.85)
MONOCYTES NFR BLD AUTO: 6.2 % (ref 0–13.4)
NEUTROPHILS # BLD AUTO: 3.83 K/UL (ref 2–7.15)
NEUTROPHILS NFR BLD: 52.9 % (ref 44–72)
NRBC # BLD AUTO: 0 K/UL
NRBC BLD AUTO-RTO: 0 /100 WBC
PLATELET # BLD AUTO: 381 K/UL (ref 164–446)
PMV BLD AUTO: 9.5 FL (ref 9–12.9)
POTASSIUM SERPL-SCNC: 4 MMOL/L (ref 3.6–5.5)
RBC # BLD AUTO: 4.16 M/UL (ref 4.2–5.4)
SIGNIFICANT IND 70042: NORMAL
SIGNIFICANT IND 70042: NORMAL
SITE SITE: NORMAL
SITE SITE: NORMAL
SODIUM SERPL-SCNC: 137 MMOL/L (ref 135–145)
SOURCE SOURCE: NORMAL
SOURCE SOURCE: NORMAL
WBC # BLD AUTO: 7.2 K/UL (ref 4.8–10.8)

## 2017-06-17 PROCEDURE — 80048 BASIC METABOLIC PNL TOTAL CA: CPT

## 2017-06-17 PROCEDURE — A9270 NON-COVERED ITEM OR SERVICE: HCPCS | Performed by: FAMILY MEDICINE

## 2017-06-17 PROCEDURE — 99239 HOSP IP/OBS DSCHRG MGMT >30: CPT | Performed by: FAMILY MEDICINE

## 2017-06-17 PROCEDURE — 700111 HCHG RX REV CODE 636 W/ 250 OVERRIDE (IP): Performed by: INTERNAL MEDICINE

## 2017-06-17 PROCEDURE — 85025 COMPLETE CBC W/AUTO DIFF WBC: CPT

## 2017-06-17 PROCEDURE — A9270 NON-COVERED ITEM OR SERVICE: HCPCS | Performed by: INTERNAL MEDICINE

## 2017-06-17 PROCEDURE — A9270 NON-COVERED ITEM OR SERVICE: HCPCS | Performed by: PHARMACIST

## 2017-06-17 PROCEDURE — 700105 HCHG RX REV CODE 258: Performed by: INTERNAL MEDICINE

## 2017-06-17 PROCEDURE — 700112 HCHG RX REV CODE 229: Performed by: INTERNAL MEDICINE

## 2017-06-17 PROCEDURE — 36415 COLL VENOUS BLD VENIPUNCTURE: CPT

## 2017-06-17 PROCEDURE — 700102 HCHG RX REV CODE 250 W/ 637 OVERRIDE(OP): Performed by: PHARMACIST

## 2017-06-17 PROCEDURE — 700102 HCHG RX REV CODE 250 W/ 637 OVERRIDE(OP): Performed by: FAMILY MEDICINE

## 2017-06-17 RX ORDER — AMOXICILLIN AND CLAVULANATE POTASSIUM 875; 125 MG/1; MG/1
1 TABLET, FILM COATED ORAL 2 TIMES DAILY
Qty: 14 TAB | Refills: 0 | Status: SHIPPED | OUTPATIENT
Start: 2017-06-17 | End: 2017-06-22 | Stop reason: SDUPTHER

## 2017-06-17 RX ORDER — AMOXICILLIN AND CLAVULANATE POTASSIUM 875; 125 MG/1; MG/1
1 TABLET, FILM COATED ORAL 2 TIMES DAILY
Qty: 14 TAB | Refills: 0 | Status: SHIPPED | OUTPATIENT
Start: 2017-06-17 | End: 2017-06-17

## 2017-06-17 RX ORDER — DOXYCYCLINE HYCLATE 100 MG
100 TABLET ORAL 2 TIMES DAILY
Qty: 14 TAB | Refills: 0 | Status: SHIPPED | OUTPATIENT
Start: 2017-06-17 | End: 2017-06-22 | Stop reason: SDUPTHER

## 2017-06-17 RX ORDER — L. ACIDOPHILUS/L.BULGARICUS 100MM CELL
1 GRANULES IN PACKET (EA) ORAL 2 TIMES DAILY WITH MEALS
Qty: 30 PACKET | Refills: 0 | Status: SHIPPED | OUTPATIENT
Start: 2017-06-17 | End: 2017-11-11

## 2017-06-17 RX ADMIN — LACTOBACILLUS ACIDOPHILUS / LACTOBACILLUS BULGARICUS 1 PACKET: 100 MILLION CFU STRENGTH GRANULES at 07:42

## 2017-06-17 RX ADMIN — DOXYCYCLINE 100 MG: 100 TABLET ORAL at 07:42

## 2017-06-17 RX ADMIN — DOCUSATE SODIUM 100 MG: 100 CAPSULE ORAL at 07:42

## 2017-06-17 RX ADMIN — AMPICILLIN SODIUM AND SULBACTAM SODIUM 3 G: 2; 1 INJECTION, POWDER, FOR SOLUTION INTRAMUSCULAR; INTRAVENOUS at 00:30

## 2017-06-17 RX ADMIN — AMPICILLIN SODIUM AND SULBACTAM SODIUM 3 G: 2; 1 INJECTION, POWDER, FOR SOLUTION INTRAMUSCULAR; INTRAVENOUS at 11:58

## 2017-06-17 RX ADMIN — AMPICILLIN SODIUM AND SULBACTAM SODIUM 3 G: 2; 1 INJECTION, POWDER, FOR SOLUTION INTRAMUSCULAR; INTRAVENOUS at 05:55

## 2017-06-17 ASSESSMENT — PAIN SCALES - GENERAL
PAINLEVEL_OUTOF10: 0
PAINLEVEL_OUTOF10: 0

## 2017-06-17 NOTE — PROGRESS NOTES
Received report from night shift RN. Assumed care of patient. Pt assessed and stable. VSS. Patient reports 0/10 pain at this time. Discussed plan of care for day with patient and received verbal understanding. Call light within reach, bed in low position.  Educated pt re fall precautions and received verbal understanding.  However, pt continues to refuse bed alarms.  Pt is alert, oriented, steady on feet and calls appropriately.

## 2017-06-17 NOTE — DISCHARGE PLANNING
Medical Social Work    SW received page from pt nurse. Pt is anticipated to d/c home today and is requesting an airline letter. SYEDA received flight information from pt's nurse and will provide letter to pt at bedside.

## 2017-06-17 NOTE — PROGRESS NOTES
Discharge orders received.  IV discontinued.  Instructions, prescriptions and education given to patient. Dr. Yates at bedside and signed letters to [employer] and work for patient.  Letters provided to patient. Follow up appointments discussed.  Patient verbalized understanding of dc instructions and prescriptions. Patient signed dc instructions. Patient verbalized she had all belongings with her.  Placed order for transport to take patient to [facility].

## 2017-06-17 NOTE — PROGRESS NOTES
Midnight antibiotic hung.  Pt c/o pain at iv site.  Slightly swollen.  New iv insert.  Right calf dry and swollen.  Up independently to bathroom.

## 2017-06-17 NOTE — PROGRESS NOTES
Patient requesting letter on [facility] letterhead for [employer] flight to [facility] on June 9th, 2017 which she missed due to her admit.  Paged  and provided her with the information.   will have letter ready in a couple of hours.  Informed patient of status.  Received verbal understanding.

## 2017-06-17 NOTE — DISCHARGE INSTRUCTIONS
Discharge Instructions    Discharged to home by car with relative. Discharged via wheelchair, hospital escort: Yes.  Special equipment needed: Not Applicable    Be sure to schedule a follow-up appointment with your primary care doctor or any specialists as instructed.     Discharge Plan:   Diet Plan: Discussed  Activity Level: Discussed  Confirmed Follow up Appointment: Patient to Call and Schedule Appointment  Confirmed Symptoms Management: Discussed  Medication Reconciliation Updated: Yes  Influenza Vaccine Indication: Not indicated: Previously immunized this influenza season and > 8 years of age    I understand that a diet low in cholesterol, fat, and sodium is recommended for good health. Unless I have been given specific instructions below for another diet, I accept this instruction as my diet prescription.   Other diet: Regular, healthy diet    Special Instructions: Cellulitis  Cellulitis is an infection of the skin and the tissue beneath it. The infected area is usually red and tender. Cellulitis occurs most often in the arms and lower legs.   CAUSES   Cellulitis is caused by bacteria that enter the skin through cracks or cuts in the skin. The most common types of bacteria that cause cellulitis are staphylococci and streptococci.  SIGNS AND SYMPTOMS   · Redness and warmth.  · Swelling.  · Tenderness or pain.  · Fever.  DIAGNOSIS   Your health care provider can usually determine what is wrong based on a physical exam. Blood tests may also be done.  TREATMENT   Treatment usually involves taking an antibiotic medicine.  HOME CARE INSTRUCTIONS   · Take your antibiotic medicine as directed by your health care provider. Finish the antibiotic even if you start to feel better.  · Keep the infected arm or leg elevated to reduce swelling.  · Apply a warm cloth to the affected area up to 4 times per day to relieve pain.  · Take medicines only as directed by your health care provider.  · Keep all follow-up visits as  directed by your health care provider.  SEEK MEDICAL CARE IF:   · You notice red streaks coming from the infected area.  · Your red area gets larger or turns dark in color.  · Your bone or joint underneath the infected area becomes painful after the skin has healed.  · Your infection returns in the same area or another area.  · You notice a swollen bump in the infected area.  · You develop new symptoms.  · You have a fever.  SEEK IMMEDIATE MEDICAL CARE IF:   · You feel very sleepy.  · You develop vomiting or diarrhea.  · You have a general ill feeling (malaise) with muscle aches and pains.  MAKE SURE YOU:   · Understand these instructions.  · Will watch your condition.  · Will get help right away if you are not doing well or get worse.     This information is not intended to replace advice given to you by your health care provider. Make sure you discuss any questions you have with your health care provider.     Antibiotic Medication  Antibiotics are among the most frequently prescribed medicines. Antibiotics cure illness by assisting our body to injure or kill the bacteria that cause infection. While antibiotics are useful to treat a wide variety of infections they are useless against viruses. Antibiotics cannot cure colds, flu, or other viral infections.   There are many types of antibiotics available. Your caregiver will decide which antibiotic will be useful for an illness. Never take or give someone else's antibiotics or left over medicine.  Your caregiver may also take into account:  · Allergies.  · The cost of the medicine.  · Dosing schedules.  · Taste.  · Common side effects when choosing an antibiotic for an infection.  Ask your caregiver if you have questions about why a certain medicine was chosen.  HOME CARE INSTRUCTIONS  Read all instructions and labels on medicine bottles carefully. Some antibiotics should be taken on an empty stomach while others should be taken with food. Taking antibiotics  incorrectly may reduce how well they work. Some antibiotics need to be kept in the refrigerator. Others should be kept at room temperature. Ask your caregiver or pharmacist if you do not understand how to give the medicine.  Be sure to give the amount of medicine your caregiver has prescribed. Even if you feel better and your symptoms improve, bacteria may still remain alive in the body. Taking all of the medicine will prevent:  · The infection from returning and becoming harder to treat.  · Complications from partially treated infections.  If there is any medicine left over after you have taken the medicine as your caregiver has instructed, throw the medicine away.  Be sure to tell your caregiver if you:  · Are allergic to any medicines.  · Are pregnant or intend to become pregnant while using this medicine.  · Are breastfeeding.  · Are taking any other prescription, non-prescription medicine, or herbal remedies.  · Have any other medical conditions or problems you have not already discussed.  If you are taking birth control pills, they may not work while you are on antibiotics. To avoid unwanted pregnancy:  · Continue taking your birth control pills as usual.  · Use a second form of birth control (such as condoms) while you are taking antibiotic medicine.  · When you finish taking the antibiotic medicine, continue using the second form of birth control until you are finished with your current 1 month cycle of birth control pills.  Try not to miss any doses of medicine. If you miss a dose, take it as soon as possible. However, if it is almost time for the next dose and the dosing schedule is:  · 2 doses a day, take the missed dose and the next dose 5 to 6 hours apart.  · 3 or more doses a day, take the missed dose and the next dose 2 to 4 hours apart, then go back to the normal schedule.  · If you are unable to make up a missed dose, take the next scheduled dose on time and complete the missed dose at the end of  the prescribed time for your medicine.  SIDE EFFECTS TO TAKING ANTIBIOTICS  Common side effects to antibiotic use include:  · Soft stools or diarrhea.  · Mild stomach upset.  · Sun sensitivity.  SEEK MEDICAL CARE IF:   · If you get worse or do not improve within a few days of starting the medicine.  · Vomiting develops.  · Diaper rash or rash on the genitals appears.  · Vaginal itching occurs.  · White patches appear on the tongue or in the mouth.  · Severe watery diarrhea and abdominal cramps occur.  · Signs of an allergy develop (hives, unknown itchy rash appears). STOP TAKING THE ANTIBIOTIC.  SEEK IMMEDIATE MEDICAL CARE IF:   · Urine turns dark or blood colored.  · Skin turns yellow.  · Easy bruising or bleeding occurs.  · Joint pain or muscle aches occur.  · Fever returns.  · Severe headache occurs.  · Signs of an allergy develop (trouble breathing, wheezing, swelling of the lips, face or tongue, fainting, or blisters on the skin or in the mouth). STOP TAKING THE ANTIBIOTIC.     This information is not intended to replace advice given to you by your health care provider. Make sure you discuss any questions you have with your health care provider.     · Is patient discharged on Warfarin / Coumadin?   No     · Is patient Post Blood Transfusion?  No    Depression / Suicide Risk    As you are discharged from this [facility] facility, it is important to learn how to keep safe from harming yourself.    Recognize the warning signs:  · Abrupt changes in personality, positive or negative- including increase in energy   · Giving away possessions  · Change in eating patterns- significant weight changes-  positive or negative  · Change in sleeping patterns- unable to sleep or sleeping all the time   · Unwillingness or inability to communicate  · Depression  · Unusual sadness, discouragement and loneliness  · Talk of wanting to die  · Neglect of personal appearance   · Rebelliousness- reckless behavior  · Withdrawal from  people/activities they love  · Confusion- inability to concentrate     If you or a loved one observes any of these behaviors or has concerns about self-harm, here's what you can do:  · Talk about it- your feelings and reasons for harming yourself  · Remove any means that you might use to hurt yourself (examples: pills, rope, extension cords, firearm)  · Get professional help from the community (Mental Health, Substance Abuse, psychological counseling)  · Do not be alone:Call your Safe Contact- someone whom you trust who will be there for you.  · Call your local CRISIS HOTLINE 676-7028 or 623-249-1898  · Call your local Children's Mobile Crisis Response Team [place] (101) 445-8028 or www.olook  · Call the toll free National Suicide Prevention Hotlines   · National Suicide Prevention Lifeline 699-866-FXFV (6732)  · National Hope Line Network 800-SUICIDE (562-6973)

## 2017-06-17 NOTE — CARE PLAN
Problem: Safety  Goal: Will remain free from falls  Intervention: Assess risk factors for falls    06/17/17 0254   OTHER   Mobility Status Assessment 0-Ambulates & Transfers Independently. No Assistance Required   Pt Calls for Assistance No assistance required   Steady gait noted when up      Problem: Bowel/Gastric:  Goal: Will not experience complications related to bowel motility  Intervention: Assess baseline bowel pattern  [facility] 6/16      Problem: Pain Management  Goal: Pain level will decrease to patient’s comfort goal  Intervention: Follow pain managment plan developed in collaboration with patient and Interdisciplinary Team  Denied any pain

## 2017-06-17 NOTE — CARE PLAN
Problem: Communication  Goal: The ability to communicate needs accurately and effectively will improve  Patient able to communicate verbally in English.  Able to make needs known.  Uses call light appropriately.    Problem: Discharge Barriers/Planning  Goal: Patient’s continuum of care needs will be met  Patient to be discharged home with oral antibiotics once medically cleared.  No outpatient needs.    Problem: Pain Management  Goal: Pain level will decrease to patient’s comfort goal  Patient denies pain at this time.    Problem: Mobility  Goal: Risk for activity intolerance will decrease  Patient independent with mobility.  Steady on feed.  Independent with ADLs.      Problem: Respiratory:  Goal: Respiratory status will improve  Patient on room air saturating at 94%

## 2017-06-18 ENCOUNTER — PATIENT OUTREACH (OUTPATIENT)
Dept: HEALTH INFORMATION MANAGEMENT | Facility: OTHER | Age: 37
End: 2017-06-18

## 2017-06-18 NOTE — DISCHARGE SUMMARY
DISCHARGE DIAGNOSES:  1.  Cellulitis.  2.  Sepsis.  3.  Hypotension.  4.  Renal insufficiency.  5.  Macrocytosis.  6.  Hyponatremia.    CONSULTS:  None.    PROCEDURES PERFORMED:  1.  Venous duplex, which was negative for DVT.  There is nonspecific right leg   cutaneous and subcutaneous edema without hematoma or abscess.  2.  CT scan, which showed subcutaneous stranding in the posteromedial calf   with overlying skin thickening compatible with cellulitis.  Ill-defined fluid   seen within the subcutaneous soft tissue, which likely represent edema; no   ill-defined collection is seen to suggest the presence of an abscess.  No   evidence of osseous erosion to suggest osteomyelitis.    HISTORY OF PRESENT ILLNESS:  For detailed H and P, please see Dr. Brewer's note   on 06/11/2017.  In summary, a 36-year-old female who came in with right leg   redness and swelling.  She apparently was prescribed Bactrim as well as   Keflex; however, failed outpatient treatment.  Patient was admitted for   further evaluation and management.    HOSPITAL COURSE:  Upon admission, per ID recommendations, patient was placed   on intravenous Unasyn as well as doxycycline.  Patient's right leg redness and   swelling improved, although she did appear to have some localized fluid   collection.  A venous duplex as well as ultrasound were done, which failed to   report any DVT or abscess.  Due to the persistence of the localized fluid   collection, a CT scan of the leg was done which was negative also for abscess.    It did improve, and it now appears to be subsiding.  She will now be   discharged improved and in stable condition.    DISCHARGE MEDICATIONS:  1.  Augmentin 1 tab twice a day for 7 more days.  2.  Doxycycline 100 mg twice a day for 7 more days.  3.  Lactobacillus 1 packet twice a day.    DIET:  Regular.    ACTIVITY:  As tolerated.    FOLLOWUP:  Follow up with Dr. George Ta in 2 weeks.    Total discharge time was 35 minutes.        ____________________________________     MD ANGELIQUE BOBO / MOO    DD:  06/17/2017 12:45:51  DT:  06/17/2017 19:55:28    D#:  [id number]  Job#:  [id number]

## 2017-06-22 ENCOUNTER — OFFICE VISIT (OUTPATIENT)
Dept: MEDICAL GROUP | Facility: MEDICAL CENTER | Age: 37
End: 2017-06-22
Payer: COMMERCIAL

## 2017-06-22 VITALS
TEMPERATURE: 99.3 F | WEIGHT: 120.4 LBS | RESPIRATION RATE: 16 BRPM | HEIGHT: 65 IN | BODY MASS INDEX: 20.06 KG/M2 | DIASTOLIC BLOOD PRESSURE: 82 MMHG | SYSTOLIC BLOOD PRESSURE: 112 MMHG | OXYGEN SATURATION: 97 % | HEART RATE: 56 BPM

## 2017-06-22 DIAGNOSIS — L03.115 CELLULITIS OF RIGHT LOWER EXTREMITY: ICD-10-CM

## 2017-06-22 PROCEDURE — 99214 OFFICE O/P EST MOD 30 MIN: CPT | Performed by: INTERNAL MEDICINE

## 2017-06-22 RX ORDER — CLINDAMYCIN HYDROCHLORIDE 300 MG/1
300 CAPSULE ORAL 3 TIMES DAILY
Qty: 15 CAP | Refills: 0 | Status: SHIPPED | OUTPATIENT
Start: 2017-06-22 | End: 2017-06-27

## 2017-06-22 RX ORDER — DOXYCYCLINE HYCLATE 100 MG
100 TABLET ORAL 2 TIMES DAILY
Qty: 10 TAB | Refills: 0 | Status: SHIPPED | OUTPATIENT
Start: 2017-06-22 | End: 2017-06-27

## 2017-06-22 RX ORDER — AMOXICILLIN AND CLAVULANATE POTASSIUM 875; 125 MG/1; MG/1
1 TABLET, FILM COATED ORAL 2 TIMES DAILY
Qty: 10 TAB | Refills: 0 | Status: SHIPPED | OUTPATIENT
Start: 2017-06-22 | End: 2017-06-27

## 2017-06-22 RX ORDER — CLINDAMYCIN HYDROCHLORIDE 300 MG/1
300 CAPSULE ORAL 3 TIMES DAILY
Qty: 21 CAP | Refills: 0 | Status: SHIPPED | OUTPATIENT
Start: 2017-06-22 | End: 2017-06-22 | Stop reason: SDUPTHER

## 2017-06-22 NOTE — MR AVS SNAPSHOT
"        Afshan Wilksshellie   2017 2:00 PM   Office Visit   MRN: [id number]    Department:  [address] [place] [address]   Dept Phone:  [phone]    Description:  Female : 1980   Provider:  George Ta M.D.           Reason for Visit     Hospital Follow-up follow up on cellulitis      Allergies as of 2017     No Known Allergies      Vital Signs     Blood Pressure Pulse Temperature Respirations Height Weight    112/82 mmHg 56 37.4 °C (99.3 °F) 16 1.651 m (5' 5\") 54.613 kg (120 lb 6.4 oz)    Body Mass Index Oxygen Saturation Last Menstrual Period Smoking Status          20.04 kg/m2 97% 2017 (Approximate) Never Smoker         Basic Information     Date Of Birth Sex Race Ethnicity Preferred Language    1980 Female , Unknown Non- English      Problem List              ICD-10-CM Priority Class Noted - Resolved    Irritable bowel syndrome with constipation K58.1   10/26/2016 - Present    Cellulitis L03.90 High  2017 - Present    Sepsis with hypotension (CMS-HCC) A41.9 High  2017 - Present    Renal insufficiency N28.9 Medium  2017 - Present    Macrocytosis D75.89 Medium  2017 - Present    Hyponatremia E87.1 Medium  2017 - Present      Health Maintenance        Date Due Completion Dates    PAP SMEAR 2019 (Done)    Override on 2016: Done    IMM DTaP/Tdap/Td Vaccine (2 - Td) 10/26/2026 10/26/2016            Current Immunizations     Influenza Vaccine Quad Inj (Preserved) 10/26/2016  9:50 AM    MMR Vaccine 2016    Tdap Vaccine 10/26/2016  9:48 AM      Below and/or attached are the medications your provider expects you to take. Review all of your home medications and newly ordered medications with your provider and/or pharmacist. Follow medication instructions as directed by your provider and/or pharmacist. Please keep your medication list with you and share with your provider. Update the information when medications are discontinued, doses are " changed, or new medications (including over-the-counter products) are added; and carry medication information at all times in the event of emergency situations     Allergies:  No Known Allergies          Medications  Valid as of: June 22, 2017 -  2:30 PM    Generic Name Brand Name Tablet Size Instructions for use    Amoxicillin-Pot Clavulanate (Tab) AUGMENTIN 875-125 MG Take 1 Tab by mouth 2 times a day for 5 days.        Clindamycin HCl (Cap) CLEOCIN 300 MG Take 1 Cap by mouth 3 times a day for 5 days.        Doxycycline Hyclate (Tab) VIBRAMYCIN 100 MG Take 1 Tab by mouth 2 times a day for 5 days.        Lactobacillus (Pack) LACTINEX/FLORANEX  Take 1 Packet by mouth 2 times a day, with meals.        .                 Medicines prescribed today were sent to:     [facility] [address].    [address] [place] [id number]    Phone: [phone] Fax: [phone]    Open 24 Hours?: No      Medication refill instructions:       If your prescription bottle indicates you have medication refills left, it is not necessary to call your provider’s office. Please contact your pharmacy and they will refill your medication.    If your prescription bottle indicates you do not have any refills left, you may request refills at any time through one of the following ways: The online iStoryTime system (except Urgent Care), by calling your provider’s office, or by asking your pharmacy to contact your provider’s office with a refill request. Medication refills are processed only during regular business hours and may not be available until the next business day. Your provider may request additional information or to have a follow-up visit with you prior to refilling your medication.   *Please Note: Medication refills are assigned a new Rx number when refilled electronically. Your pharmacy may indicate that no refills were authorized even though a new prescription for the same medication is available at the  pharmacy. Please request the medicine by name with the pharmacy before contacting your provider for a refill.           MyChart Access Code: Activation code not generated  Current MyChart Status: Active

## 2017-06-22 NOTE — PROGRESS NOTES
"CC: Follow-up hospitalization     HPI:   Afshan presents today with the following.    1. Cellulitis of right lower extremity  presents after hospitalization for an injury to her right leg. She did have imaging it did not show abscess but did show edema. She was transitioned to oral antibiotics and was doing well and so she went back to work. She does have worsening of symptoms when she is on her feet. No fevers or chills rapidly spreading redness but again it is more edematous. She was considered septic with hypotension and she has no dizziness or any other symptoms currently.      Patient Active Problem List    Diagnosis Date Noted   • Sepsis with hypotension (CMS-[facility]) 06/12/2017     Priority: High   • Cellulitis 06/11/2017     Priority: High   • Renal insufficiency 06/13/2017     Priority: Medium   • Macrocytosis 06/13/2017     Priority: Medium   • Hyponatremia 06/13/2017     Priority: Medium   • Irritable bowel syndrome with constipation 10/26/2016       Current Outpatient Prescriptions   Medication Sig Dispense Refill   • clindamycin (CLEOCIN) 300 MG Cap Take 1 Cap by mouth 3 times a day for 5 days. 15 Cap 0   • amoxicillin-clavulanate (AUGMENTIN) 875-125 MG Tab Take 1 Tab by mouth 2 times a day for 5 days. 10 Tab 0   • doxycycline (VIBRAMYCIN) 100 MG Tab Take 1 Tab by mouth 2 times a day for 5 days. 10 Tab 0   • lactobacillus granules (LACTINEX/FLORANEX) Pack Take 1 Packet by mouth 2 times a day, with meals. (Patient not taking: Reported on 6/22/2017) 30 Packet 0     No current facility-administered medications for this visit.         Allergies as of 06/22/2017   • (No Known Allergies)        ROS: As per HPI.    /82 mmHg  Pulse 56  Temp(Src) 37.4 °C (99.3 °F)  Resp 16  Ht 1.651 m (5' 5\")  Wt 54.613 kg (120 lb 6.4 oz)  BMI 20.04 kg/m2  SpO2 97%  LMP 05/31/2017 (Approximate)    Physical Exam:  Gen:         Alert and oriented, No apparent distress.  Neck:        No Lymphadenopathy or Bruits.  Lungs:   "   Clear to auscultation bilaterally  CV:          Regular rate and rhythm. No murmurs, rubs or gallops.               Ext:          No clubbing, cyanosis, edema.      Assessment and Plan.   36 y.o. female with the following issues.    1. Cellulitis of right lower extremity  Area of the calf is still somewhat red. Does not feel like an abscess formation corroborated by imaging. Some concerns for possible fasciitis. It is not rapidly progressed but have continued a course of antibiotics recommend she be off her feet for the next 4 days. Have given a backup prescription of clindamycin if when she completes her current course of antibiotics is not improving. She will return to the emergency room for any advancement of redness fevers nausea or vomiting.

## 2017-06-22 NOTE — Clinical Note
June 22, 2017        Afshan Huitron      Please excuse work until 6/22/17-2/26/17 may return as able.                        George Ta

## 2017-06-26 ENCOUNTER — TELEPHONE (OUTPATIENT)
Dept: MEDICAL GROUP | Facility: MEDICAL CENTER | Age: 37
End: 2017-06-26

## 2017-06-26 NOTE — TELEPHONE ENCOUNTER
Future Appointments       Provider Department Center    6/27/2017 8:20 AM George Ta M.D. [facility] [address]        ESTABLISHED PATIENT PRE-VISIT PLANNING     Note: Patient will not be contacted if there is no indication to call.     1.  Reviewed note from last office visit with PCP and/or other med group provider: Yes    2.  If any orders were placed at last visit, do we have Results/Consult Notes?        •  Labs - Labs were not ordered at last office visit.       •  Imaging - Imaging was not ordered at last office visit.       •  Referrals - No referrals were ordered at last office visit.    3.  Immunizations were updated in [place] using WebIZ?: Yes       •  Web Iz Recommendations: HEPATITIS A  TD VARICELLA (Chicken Pox)     4.  Patient is due for the following Health Maintenance Topics:   There are no preventive care reminders to display for this patient.    - Patient is up-to-date on all Health Maintenance topics. No records have been requested at this time.    5.  Patient was not informed to arrive 15 min prior to their scheduled appointment and bring in their medication bottles.

## 2017-06-27 ENCOUNTER — OFFICE VISIT (OUTPATIENT)
Dept: MEDICAL GROUP | Facility: MEDICAL CENTER | Age: 37
End: 2017-06-27
Payer: COMMERCIAL

## 2017-06-27 VITALS
TEMPERATURE: 98 F | RESPIRATION RATE: 16 BRPM | HEIGHT: 65 IN | DIASTOLIC BLOOD PRESSURE: 70 MMHG | SYSTOLIC BLOOD PRESSURE: 106 MMHG | WEIGHT: 121.8 LBS | OXYGEN SATURATION: 100 % | BODY MASS INDEX: 20.29 KG/M2 | HEART RATE: 69 BPM

## 2017-06-27 PROBLEM — A41.9 SEPSIS WITH HYPOTENSION (HCC): Status: RESOLVED | Noted: 2017-06-12 | Resolved: 2017-06-27

## 2017-06-27 PROBLEM — I95.9 SEPSIS WITH HYPOTENSION (HCC): Status: RESOLVED | Noted: 2017-06-12 | Resolved: 2017-06-27

## 2017-06-27 PROCEDURE — 99213 OFFICE O/P EST LOW 20 MIN: CPT | Performed by: INTERNAL MEDICINE

## 2017-06-27 NOTE — PROGRESS NOTES
"CC: Leg swelling.    HPI:   Afshan presents today with the following.    1. Seroma  Presents after starting on clindamycin for an infection the lower leg. She reports the redness has regressed but they're still swelling in the right leg. It does get red when she is on her feet but redness quickly calms after elevating. She denies any fevers or chills no spreading redness.      Patient Active Problem List    Diagnosis Date Noted   • Cellulitis 06/11/2017     Priority: High   • Renal insufficiency 06/13/2017     Priority: Medium   • Macrocytosis 06/13/2017     Priority: Medium   • Hyponatremia 06/13/2017     Priority: Medium   • Irritable bowel syndrome with constipation 10/26/2016       Current Outpatient Prescriptions   Medication Sig Dispense Refill   • clindamycin (CLEOCIN) 300 MG Cap Take 1 Cap by mouth 3 times a day for 5 days. 15 Cap 0   • amoxicillin-clavulanate (AUGMENTIN) 875-125 MG Tab Take 1 Tab by mouth 2 times a day for 5 days. 10 Tab 0   • lactobacillus granules (LACTINEX/FLORANEX) Pack Take 1 Packet by mouth 2 times a day, with meals. (Patient not taking: Reported on 6/22/2017) 30 Packet 0     No current facility-administered medications for this visit.         Allergies as of 06/27/2017   • (No Known Allergies)        ROS: As per HPI.    /70 mmHg  Pulse 69  Temp(Src) 36.7 °C (98 °F)  Resp 16  Ht 1.651 m (5' 5\")  Wt 55.248 kg (121 lb 12.8 oz)  BMI 20.27 kg/m2  SpO2 100%  LMP 05/31/2017 (Approximate)    Physical Exam:  Gen:         Alert and oriented, No apparent distress.  Neck:        No Lymphadenopathy or Bruits.  Lungs:     Clear to auscultation bilaterally  CV:          Regular rate and rhythm. No murmurs, rubs or gallops.               Ext:          No clubbing, cyanosis, edema.      Assessment and Plan.   36 y.o. female with the following issues.    1. Seroma  Subcutaneous fluid collection that is not particularly indurated. Makes most sense to be a seroma and not thought to be " infectious at this time. She will complete antibiotics and have written for repeat ultrasound. Given the persistence of the fluid collection have place referral to surgeon as well.  - US-EXTREMITY NON VASCULAR UNILATERAL RIGHT; Future  - REFERRAL TO GENERAL SURGERY

## 2017-06-27 NOTE — MR AVS SNAPSHOT
"        Afshan Huitron   2017 8:20 AM   Office Visit   MRN: [id number]    Department:  [address] [place] [address] Group   Dept Phone:  [phone]    Description:  Female : 1980   Provider:  George Ta M.D.           Reason for Visit     Follow-Up follow up on cellulitis      Allergies as of 2017     No Known Allergies      You were diagnosed with     Seroma   [[id number]]         Vital Signs     Blood Pressure Pulse Temperature Respirations Height Weight    106/70 mmHg 69 36.7 °C (98 °F) 16 1.651 m (5' 5\") 55.248 kg (121 lb 12.8 oz)    Body Mass Index Oxygen Saturation Last Menstrual Period Smoking Status          20.27 kg/m2 100% 2017 (Approximate) Never Smoker         Basic Information     Date Of Birth Sex Race Ethnicity Preferred Language    1980 Female , Unknown Non- English      Your appointments     2017  8:30 AM   US EXTREMITY (30) with [address] US 1   [facility] - ULTRASOUND - [address]    [address]  [place] [address]   [phone]              Problem List              ICD-10-CM Priority Class Noted - Resolved    Irritable bowel syndrome with constipation K58.1   10/26/2016 - Present    Cellulitis L03.90 High  2017 - Present    Renal insufficiency N28.9 Medium  2017 - Present    Macrocytosis D75.89 Medium  2017 - Present    Hyponatremia E87.1 Medium  2017 - Present      Health Maintenance        Date Due Completion Dates    PAP SMEAR 2019 (Done)    Override on 2016: Done    IMM DTaP/Tdap/Td Vaccine (2 - Td) 10/26/2026 10/26/2016            Current Immunizations     Influenza Vaccine Quad Inj (Preserved) 10/26/2016  9:50 AM    MMR Vaccine 2016    Tdap Vaccine 10/26/2016  9:48 AM      Below and/or attached are the medications your provider expects you to take. Review all of your home medications and newly ordered medications with your provider and/or pharmacist. Follow medication instructions as " directed by your provider and/or pharmacist. Please keep your medication list with you and share with your provider. Update the information when medications are discontinued, doses are changed, or new medications (including over-the-counter products) are added; and carry medication information at all times in the event of emergency situations     Allergies:  No Known Allergies          Medications  Valid as of: June 27, 2017 -  8:42 AM    Generic Name Brand Name Tablet Size Instructions for use    Amoxicillin-Pot Clavulanate (Tab) AUGMENTIN 875-125 MG Take 1 Tab by mouth 2 times a day for 5 days.        Clindamycin HCl (Cap) CLEOCIN 300 MG Take 1 Cap by mouth 3 times a day for 5 days.        Lactobacillus (Pack) LACTINEX/FLORANEX  Take 1 Packet by mouth 2 times a day, with meals.        .                 Medicines prescribed today were sent to:     [facility].    [address] [place] [address]    Phone: [phone] Fax: [phone]    Open 24 Hours?: No      Medication refill instructions:       If your prescription bottle indicates you have medication refills left, it is not necessary to call your provider’s office. Please contact your pharmacy and they will refill your medication.    If your prescription bottle indicates you do not have any refills left, you may request refills at any time through one of the following ways: The online [employer] system (except Urgent Care), by calling your provider’s office, or by asking your pharmacy to contact your provider’s office with a refill request. Medication refills are processed only during regular business hours and may not be available until the next business day. Your provider may request additional information or to have a follow-up visit with you prior to refilling your medication.   *Please Note: Medication refills are assigned a new Rx number when refilled electronically. Your pharmacy may indicate that no refills were  authorized even though a new prescription for the same medication is available at the pharmacy. Please request the medicine by name with the pharmacy before contacting your provider for a refill.        Your To Do List     Future Labs/Procedures Complete By Expires    US-EXTREMITY NON VASCULAR UNILATERAL RIGHT  As directed 6/27/2018      Referral     A referral request has been sent to our patient care coordination department. Please allow 3-5 business days for us to process this request and contact you either by phone or mail. If you do not hear from us by the 5th business day, please call us at [phone].           Thumb Friendly Access Code: Activation code not generated  Current Thumb Friendly Status: Active

## 2017-06-28 ENCOUNTER — HOSPITAL ENCOUNTER (OUTPATIENT)
Dept: RADIOLOGY | Facility: MEDICAL CENTER | Age: 37
End: 2017-06-28
Attending: INTERNAL MEDICINE
Payer: COMMERCIAL

## 2017-06-28 PROCEDURE — 76882 US LMTD JT/FCL EVL NVASC XTR: CPT | Mod: RT

## 2017-11-11 ENCOUNTER — APPOINTMENT (OUTPATIENT)
Dept: RADIOLOGY | Facility: MEDICAL CENTER | Age: 37
DRG: 494 | End: 2017-11-11
Attending: SURGERY
Payer: COMMERCIAL

## 2017-11-11 ENCOUNTER — APPOINTMENT (OUTPATIENT)
Dept: RADIOLOGY | Facility: MEDICAL CENTER | Age: 37
DRG: 494 | End: 2017-11-11
Attending: EMERGENCY MEDICINE
Payer: COMMERCIAL

## 2017-11-11 ENCOUNTER — HOSPITAL ENCOUNTER (INPATIENT)
Facility: MEDICAL CENTER | Age: 37
LOS: 1 days | DRG: 494 | End: 2017-11-12
Attending: EMERGENCY MEDICINE | Admitting: SURGERY
Payer: COMMERCIAL

## 2017-11-11 DIAGNOSIS — S82.202A TIBIA/FIBULA FRACTURE, LEFT, CLOSED, INITIAL ENCOUNTER: ICD-10-CM

## 2017-11-11 DIAGNOSIS — S82.892A CLOSED FRACTURE OF LEFT ANKLE, INITIAL ENCOUNTER: ICD-10-CM

## 2017-11-11 DIAGNOSIS — S82.402A TIBIA/FIBULA FRACTURE, LEFT, CLOSED, INITIAL ENCOUNTER: ICD-10-CM

## 2017-11-11 LAB
HCG UR QL: NEGATIVE
SP GR UR REFRACTOMETRY: 1.01

## 2017-11-11 PROCEDURE — 160009 HCHG ANES TIME/MIN: Performed by: SURGERY

## 2017-11-11 PROCEDURE — 500556 HCHG EXFX, SN MULTIPLE PIN CLAMP: Performed by: SURGERY

## 2017-11-11 PROCEDURE — 700101 HCHG RX REV CODE 250

## 2017-11-11 PROCEDURE — 500881 HCHG PACK, EXTREMITY: Performed by: SURGERY

## 2017-11-11 PROCEDURE — 160028 HCHG SURGERY MINUTES - 1ST 30 MINS LEVEL 3: Performed by: SURGERY

## 2017-11-11 PROCEDURE — C1713 ANCHOR/SCREW BN/BN,TIS/BN: HCPCS | Performed by: SURGERY

## 2017-11-11 PROCEDURE — 500554 HCHG EXFX, SN BAR-PIN CLAMP: Performed by: SURGERY

## 2017-11-11 PROCEDURE — 81025 URINE PREGNANCY TEST: CPT

## 2017-11-11 PROCEDURE — 0QSH35Z REPOSITION LEFT TIBIA WITH EXTERNAL FIXATION DEVICE, PERCUTANEOUS APPROACH: ICD-10-PCS | Performed by: SURGERY

## 2017-11-11 PROCEDURE — 770006 HCHG ROOM/CARE - MED/SURG/GYN SEMI*

## 2017-11-11 PROCEDURE — 160048 HCHG OR STATISTICAL LEVEL 1-5: Performed by: SURGERY

## 2017-11-11 PROCEDURE — 700111 HCHG RX REV CODE 636 W/ 250 OVERRIDE (IP)

## 2017-11-11 PROCEDURE — 160002 HCHG RECOVERY MINUTES (STAT): Performed by: SURGERY

## 2017-11-11 PROCEDURE — 500122 HCHG BOVIE, BLADE: Performed by: SURGERY

## 2017-11-11 PROCEDURE — 500440 HCHG DRESSING, STERILE ROLL (KERLIX): Performed by: SURGERY

## 2017-11-11 PROCEDURE — 110371 HCHG SHELL REV 272: Performed by: SURGERY

## 2017-11-11 PROCEDURE — 29515 APPLICATION SHORT LEG SPLINT: CPT

## 2017-11-11 PROCEDURE — 73590 X-RAY EXAM OF LOWER LEG: CPT | Mod: LT

## 2017-11-11 PROCEDURE — 302875 HCHG BANDAGE ACE 4 OR 6""

## 2017-11-11 PROCEDURE — 160035 HCHG PACU - 1ST 60 MINS PHASE I: Performed by: SURGERY

## 2017-11-11 PROCEDURE — 73600 X-RAY EXAM OF ANKLE: CPT | Mod: LT

## 2017-11-11 PROCEDURE — 502240 HCHG MISC OR SUPPLY RC 0272: Performed by: SURGERY

## 2017-11-11 PROCEDURE — 73610 X-RAY EXAM OF ANKLE: CPT | Mod: LT

## 2017-11-11 PROCEDURE — 99285 EMERGENCY DEPT VISIT HI MDM: CPT

## 2017-11-11 PROCEDURE — 160039 HCHG SURGERY MINUTES - EA ADDL 1 MIN LEVEL 3: Performed by: SURGERY

## 2017-11-11 RX ORDER — MAGNESIUM HYDROXIDE 1200 MG/15ML
LIQUID ORAL
Status: DISCONTINUED | OUTPATIENT
Start: 2017-11-11 | End: 2017-11-11 | Stop reason: HOSPADM

## 2017-11-11 RX ORDER — NAPROXEN SODIUM 220 MG
220 TABLET ORAL 2 TIMES DAILY PRN
COMMUNITY
End: 2021-09-06

## 2017-11-11 RX ORDER — MORPHINE SULFATE 4 MG/ML
1-2 INJECTION, SOLUTION INTRAMUSCULAR; INTRAVENOUS
Status: DISCONTINUED | OUTPATIENT
Start: 2017-11-11 | End: 2017-11-12 | Stop reason: HOSPADM

## 2017-11-11 RX ORDER — OXYCODONE HYDROCHLORIDE 5 MG/1
5 TABLET ORAL EVERY 4 HOURS PRN
Status: DISCONTINUED | OUTPATIENT
Start: 2017-11-11 | End: 2017-11-12 | Stop reason: HOSPADM

## 2017-11-11 RX ORDER — BUPIVACAINE HYDROCHLORIDE AND EPINEPHRINE 5; 5 MG/ML; UG/ML
INJECTION, SOLUTION EPIDURAL; INTRACAUDAL; PERINEURAL
Status: DISCONTINUED | OUTPATIENT
Start: 2017-11-11 | End: 2017-11-11 | Stop reason: HOSPADM

## 2017-11-11 RX ORDER — OXYCODONE HYDROCHLORIDE 10 MG/1
10 TABLET ORAL EVERY 4 HOURS PRN
Status: DISCONTINUED | OUTPATIENT
Start: 2017-11-11 | End: 2017-11-12 | Stop reason: HOSPADM

## 2017-11-11 RX ADMIN — FENTANYL CITRATE 25 MCG: 50 INJECTION, SOLUTION INTRAMUSCULAR; INTRAVENOUS at 21:50

## 2017-11-11 RX ADMIN — FENTANYL CITRATE 25 MCG: 50 INJECTION, SOLUTION INTRAMUSCULAR; INTRAVENOUS at 21:55

## 2017-11-11 RX ADMIN — FENTANYL CITRATE 50 MCG: 50 INJECTION, SOLUTION INTRAMUSCULAR; INTRAVENOUS at 21:45

## 2017-11-11 ASSESSMENT — PAIN SCALES - GENERAL
PAINLEVEL_OUTOF10: 5
PAINLEVEL_OUTOF10: ASSUMED PAIN PRESENT
PAINLEVEL_OUTOF10: 6
PAINLEVEL_OUTOF10: ASSUMED PAIN PRESENT

## 2017-11-11 ASSESSMENT — PATIENT HEALTH QUESTIONNAIRE - PHQ9
SUM OF ALL RESPONSES TO PHQ9 QUESTIONS 1 AND 2: 0
1. LITTLE INTEREST OR PLEASURE IN DOING THINGS: NOT AT ALL
2. FEELING DOWN, DEPRESSED, IRRITABLE, OR HOPELESS: NOT AT ALL
SUM OF ALL RESPONSES TO PHQ QUESTIONS 1-9: 0

## 2017-11-11 ASSESSMENT — LIFESTYLE VARIABLES
EVER_SMOKED: NEVER
ALCOHOL_USE: NO

## 2017-11-11 NOTE — ED PROVIDER NOTES
"ED Provider Note    CHIEF COMPLAINT  Chief Complaint   Patient presents with   • Leg Pain       HPI  Afshan Huitron is a 37 y.o. female who presents For evaluation of a left leg injury sustained this prior to arrival all snowboarding, she fell and hit a metal pole. She has pain the distal aspect of the left leg, no weakness, no numbness. She denies striking her head or having a headache, no neck pain, chest pain, back pain or abdominal pain. She is otherwise healthy, she has no significant medical problems. She is brought in by ambulance, was treated with fentanyl and has adequate pain control at this time.    REVIEW OF SYSTEMS  Negative for headache, chest pain, back pain, neck pain..     PAST MEDICAL HISTORY   has a past medical history of Endometriosis.    SOCIAL HISTORY  Social History     Social History Main Topics   • Smoking status: Never Smoker   • Smokeless tobacco: Never Used   • Alcohol use No   • Drug use: No   • Sexual activity: No       SURGICAL HISTORY   has a past surgical history that includes laparoscopy.    CURRENT MEDICATIONS  I personally reviewed the medication list in the charting documentation.     ALLERGIES  No Known Allergies    PHYSICAL EXAM  VITAL SIGNS: BP (!) 90/53   Pulse (!) 58   Temp 36.7 °C (98.1 °F)   Resp 16   Ht 1.651 m (5' 5\")   Wt 56.7 kg (125 lb)   LMP 11/10/2017   BMI 20.80 kg/m²   Constitutional: Alert in no apparent distress.  HENT: No signs of trauma.   Eyes: Conjunctiva normal, Non-icteric.   Chest: Normal nonlabored respirations  Skin: No erythema, No rash.   Musculoskeletal:Left leg is splinted, once the splint is removed there is a area of edema and likely deformity involving the distal tib-fib and proximal ankle region. She has normal sensation and capillary refill distally with a normal dorsalis pedis pulse.  Neurologic: Alert, No focal deficits noted.   Psychiatric: Affect normal, Judgment normal.    DIAGNOSTIC STUDIES / " PROCEDURES    RADIOLOGY  DX-TIBIA AND FIBULA LEFT   Final Result      1.  Acute comminuted fractures distal left tibial and fibular metaphyses.      2.  No mid shaft or proximal tib-fib fracture.      DX-ANKLE 3+ VIEWS LEFT   Final Result      1.  Acute comminuted fractures distal left tibial and fibular metaphyses.      2.  The tibial fracture involves the articular surface.      DX-ANKLE 2- VIEWS LEFT    (Results Pending)   DX-PORTABLE FLUOROSCOPY < 1 HOUR Is the patient pregnant? Performing stat test regardless of pregnancy status    (Results Pending)         COURSE & MEDICAL DECISION MAKING  Pertinent Labs & Imaging studies reviewed. (See chart for details)    Encounter Summary: This is a 37 y.o. female with an isolated left leg injury that occurred while snowboarding and she struck a pole, neurovascularly intact but likely has a deformity on exam although with the amount of swelling as well as some skin indentation from the splint the deformity portions of the appreciated. Will obtain an x-ray to evaluate further, at this time she states she does not need any further pain medication after receiving fentanyl in the ambulance ----- x-ray reveals comminuted fractures of the left distal tibia and fibula, discussed case with Dr. Hardin who has admitted the patient to the hospital for operative repair      DISPOSITION: Admitted in guarded condition      FINAL IMPRESSION  1. Tibia/fibula fracture, left, closed, initial encounter        This dictation was created using voice recognition software. The accuracy of the dictation is limited to the abilities of the software. I expect there may be some errors of grammar and possibly content. The nursing notes were reviewed and certain aspects of this information were incorporated into this note.    Electronically signed by: Des Scales, 11/11/2017 2:02 PM

## 2017-11-11 NOTE — ED NOTES
"Pt brought in by EMS from [place] s/p snowboarding accident. Pt states she was snowboarding \"pretty fast\" and lost control hitting metal pole. Pt denies hitting head, -LOC. Pt c/o L leg pain. Per EMS obvious deformity to L leg, splint currently in place. +CMS. Pt was given 50 mcg IV fentanyl and 4 mg IV zofran PTA.     Pt a/o x4, speaking in full sentences.   "

## 2017-11-11 NOTE — ED NOTES
The Medication Reconciliation process has been completed by interviewing the patient    Allergies have been reviewed  Antibiotic use in 30 days - none    Home Pharmacy:  [facility] [address]

## 2017-11-12 ENCOUNTER — APPOINTMENT (OUTPATIENT)
Dept: RADIOLOGY | Facility: MEDICAL CENTER | Age: 37
DRG: 494 | End: 2017-11-12
Attending: SURGERY
Payer: COMMERCIAL

## 2017-11-12 VITALS
HEART RATE: 63 BPM | WEIGHT: 132.72 LBS | RESPIRATION RATE: 16 BRPM | BODY MASS INDEX: 22.11 KG/M2 | HEIGHT: 65 IN | SYSTOLIC BLOOD PRESSURE: 88 MMHG | TEMPERATURE: 97.6 F | OXYGEN SATURATION: 98 % | DIASTOLIC BLOOD PRESSURE: 61 MMHG

## 2017-11-12 PROCEDURE — G8978 MOBILITY CURRENT STATUS: HCPCS | Mod: CI

## 2017-11-12 PROCEDURE — 97161 PT EVAL LOW COMPLEX 20 MIN: CPT

## 2017-11-12 PROCEDURE — G8980 MOBILITY D/C STATUS: HCPCS | Mod: CI

## 2017-11-12 PROCEDURE — 73700 CT LOWER EXTREMITY W/O DYE: CPT | Mod: LT

## 2017-11-12 PROCEDURE — G8979 MOBILITY GOAL STATUS: HCPCS | Mod: CI

## 2017-11-12 RX ORDER — TRAMADOL HYDROCHLORIDE 50 MG/1
50 TABLET ORAL EVERY 6 HOURS PRN
Qty: 80 TAB | Refills: 0 | Status: SHIPPED | OUTPATIENT
Start: 2017-11-12 | End: 2021-09-06

## 2017-11-12 RX ORDER — DOCUSATE SODIUM 100 MG/1
100 CAPSULE, LIQUID FILLED ORAL 2 TIMES DAILY
Qty: 60 CAP | Refills: 0 | Status: SHIPPED | OUTPATIENT
Start: 2017-11-12 | End: 2021-09-06

## 2017-11-12 ASSESSMENT — GAIT ASSESSMENTS
DEVIATION: ANTALGIC
ASSISTIVE DEVICE: CRUTCHES
DISTANCE (FEET): 50
GAIT LEVEL OF ASSIST: SUPERVISED

## 2017-11-12 ASSESSMENT — COGNITIVE AND FUNCTIONAL STATUS - GENERAL
MOBILITY SCORE: 23
SUGGESTED CMS G CODE MODIFIER MOBILITY: CI
CLIMB 3 TO 5 STEPS WITH RAILING: A LITTLE

## 2017-11-12 ASSESSMENT — PAIN SCALES - GENERAL
PAINLEVEL_OUTOF10: 4
PAINLEVEL_OUTOF10: 2
PAINLEVEL_OUTOF10: 5

## 2017-11-12 ASSESSMENT — LIFESTYLE VARIABLES: EVER_SMOKED: NEVER

## 2017-11-12 NOTE — THERAPY
"Physical Therapy Evaluation completed.   Bed Mobility:  Supine to Sit: Supervised  Transfers: Sit to Stand: Supervised  Gait: Level Of Assist: Supervised with Crutches       Plan of Care: Patient with no further skilled PT needs in the acute care setting at this time  Discharge Recommendations: Equipment: Crutches. Post-acute therapy Discharge to home with outpatient or home health for additional skilled therapy services.    See \"Rehab Therapy-Acute\" Patient Summary Report for complete documentation.     "

## 2017-11-12 NOTE — OR NURSING
Pt arrived to PACU 17 B. Pt connected to monitors, oxygen at 3 lpm via N/C.  Left lower leg elevated and padding under metal rods.  Beside report received from Dr. Bowers and Fran, RN

## 2017-11-12 NOTE — OR SURGEON
Immediate Post OP Note    PreOp Diagnosis: left distal tibia fracture    PostOp Diagnosis: same    Procedure(s):  EXTERNAL FIXATOR APPLICATION - Wound Class: Clean    Surgeon(s):  Ino Smith M.D.    Anesthesiologist/Type of Anesthesia:  Anesthesiologist: Dilshad Bowers M.D./General    Surgical Staff:  Assistant: Jon Marks P.A.-C.  Circulator: Fran Yeboah R.N.  Scrub Person: Yemi Lindsay  [place]: Israel Gomez R.N.    Specimens:  * No specimens in log *    Estimated Blood Loss: minimal    Findings: see dictation    Complications: none noted.         11/11/2017 9:40 PM Ino Smith

## 2017-11-12 NOTE — OR NURSING
Patient to the floor via bed, A/Ox4, significant other at bedside, very supportive.  Pt on 2 lpm N/C, no belongings, chart with patient to the floor.  Pt denies nausea, states pain level is at a comfortable level.

## 2017-11-12 NOTE — PROGRESS NOTES
Pt is back on unit. Report received from Julita in PACU. Pt states pain is 5/10 and is currently refusing pain medication. Education provided as to not let pain get out of control or become unmanageable. Education provided about post-op ambulation and voiding.

## 2017-11-12 NOTE — CARE PLAN
Problem: Discharge Barriers/Planning  Goal: Patient's continuum of care needs will be met    Intervention: Assess potential discharge barriers on admission and throughout hospital stay  Patient ok to discharge today after CT scan, CT scan able to receive patient at 1600, communicated to patient and family, aware with discharge planning.       Problem: Pain Management  Goal: Pain level will decrease to patient's comfort goal    Intervention: Follow pain managment plan developed in collaboration with patient and Interdisciplinary Team  External fixator in place, patient denies any pain at this time, patient resting comfortably in bed.

## 2017-11-12 NOTE — DISCHARGE PLANNING
D/w Dr. Hardin. He will admit her for needed surgery scheduled for tonight. Pt will be admitted OBS and Dr. FERNANDEZ reports she will most likely be discharged home tomorrow.

## 2017-11-12 NOTE — ED NOTES
Transport canceled. Awaiting answer from MD Sood whether pt can be transferred to [place] for admission.

## 2017-11-12 NOTE — PROGRESS NOTES
"Ortho Prog Note    POD #1 s/p Ex fix Left pilon fx by Dr. Smith    Doing well, would like to DC home.    /74   Pulse 88   Temp 36.4 °C (97.6 °F)   Resp 16   Ht 1.651 m (5' 5\")   Wt 60.2 kg (132 lb 11.5 oz)   LMP 11/10/2017   SpO2 97%   BMI 22.09 kg/m²     LLE: Pinsites CDI.  Pins and needles dorsal, medial and lateral foot, normal sensation plantar.  Palpable PT and DP pulses. Severe swelling, medial and lateral ecchymosis    Xray: Improved alignment Left pilon fx with distal fibula fx    Pt may DC home and follow up as outpatient    NWB LLE  ROB Naik one week, likely definitive fixation Tuesday 21    Abram Naik MD    "

## 2017-11-12 NOTE — CARE PLAN
Problem: Safety  Goal: Will remain free from injury    Intervention: Provide assistance with mobility  Pt and family educated on use of the call light system, and uses it appropriately. Staff is present for mobilization. Pt is steady during ambulation with use of FWW.      Problem: Pain Management  Goal: Pain level will decrease to patient's comfort goal    Intervention: Follow pain managment plan developed in collaboration with patient and Interdisciplinary Team  Pt reports pain level of 4, but is currently declining pain meds stating that her pain is at a tolerable level. Education provided about pain relief and adequate pain control.

## 2017-11-12 NOTE — CONSULTS
Ortho:    Please see forthcoming dictation for details. In brief, Ms. Huitron is s/p fall snowboarding with a highly comminuted unstable right distal tibia pilon fracture. I do not recommend transfer from the hospital, but rather urgent placement of an external fixator tonight then overnight observation and likely discharge home to f/u electively with Dr. Beasley, [facility] foot and ankle specialist. The patient understands this risks, benefits, and alternatives to this plan and wishes to proceed.

## 2017-11-13 NOTE — DISCHARGE SUMMARY
DISCHARGE SUMMARY    PATIENTS NAME: Afshan Huitron    MRN: [id number]  CSN: [id number]    ADMIT DATE:  11/11/2017    ADMIT MD: Ino Smith M.D.    DISCHARGE DATE: 11/12/2017    DISCHARGE DIAGNOSIS:Status post closed reduction with placement of external fixation right lower extremity.    DISCHARGE MD: No att. providers found    REASON FOR ADMISSION:Left ankle pain    PRINCIPAL DIAGNOSIS:Comminuted Right Ankle fracture    SECONDARY DIAGNOSIS:none    PROCEDURES: Operative reduction with External Fixation of Right Ankle Fracture    CONSULTATIONS: No att. providers found     HOSPITAL COURSE: Patient is a pleasant 37 year old female injured while snowboarding. She was initially seen by Dr. Scales in the [facility] ER.  Dr Smith was consulted for Orthopaedics, who felt that the nature of the patient's traumatic musculoskeletal injuries necessitated surgical intervention.  After explaining the indications, risks, benefits, and alternatives the patient wished to proceed with surgery. The patient was taken to the OR for the above mentioned procedure.  There were no complications and minimal blood loss. Afshan Huitron has done well with mobilization and pain has been well controlled with oral medications. Wound care instructions were given, patient's questions answered, and Afshan Huitron is ready for discharge to home at this time.     DISCHARGE LOCATION: [place], [place]    DVT PROPHYLAXIS:Ambulate ad miya.  ANTIBIOTICS:completed  MEDICATIONS:   Current Outpatient Prescriptions   Medication Sig Dispense Refill   • tramadol (ULTRAM) 50 MG Tab Take 1 Tab by mouth every 6 hours as needed for Moderate Pain (S/P External fixation Pilon Ankle Fracture). 80 Tab 0   • docusate sodium (COLACE) 100 MG Cap Take 1 Cap by mouth 2 times a day. 60 Cap 0     WEIGHT BEARING STATUS: Pt may not bear weight through the right foot and leg    FOLLOW UP: Dr Abram Naik at [facility] in 1-3 days.

## 2017-11-13 NOTE — DISCHARGE INSTRUCTIONS
Discharge Instructions    Discharged to home by car with relative. Discharged via wheelchair, hospital escort: Yes.  Special equipment needed: Crutches  NON-WEIGHT BEARING TO LEFT LOWER EXTREMITY.    Be sure to schedule a follow-up appointment with your primary care doctor or any specialists as instructed.     Discharge Plan:   Diet Plan: Discussed  Confirmed Follow up Appointment: Patient to Call and Schedule Appointment  Confirmed Symptoms Management: Discussed  Medication Reconciliation Updated: Yes  Influenza Vaccine Indication: Patient Refuses    I understand that a diet low in cholesterol, fat, and sodium is recommended for good health. Unless I have been given specific instructions below for another diet, I accept this instruction as my diet prescription.   Other diet: Regular    Special Instructions: Discharge instructions for the Orthopedic Patient    Follow up with Primary Care Physician within 2 weeks of discharge to home, regarding:  Review of medications and diagnostic testing.  Surveillance for medical complications.  Workup and treatment of osteoporosis, if appropriate.     -Is this a Joint Replacement patient? No    -Is this patient being discharged with medication to prevent blood clots?  No    · Is patient discharged on Warfarin / Coumadin?   No     · Is patient Post Blood Transfusion?  No    External Fixator  An external fixator is a device that holds a broken bone in a fixed position until it heals. This device is often used on your arms, legs, pelvis, or neck. It is attached to your bones with screws called pins or bolts. The pins are drilled into the bone on each side of the break and attached to a metal or carbon fiber aliza with nuts. When the bone is fully healed, the external fixator is removed. The type of external fixator you have will depend on what bone is broken. Most people need an external fixator for 6 to 12 weeks. Sometimes it is needed for up to 1 year for fractures that heal slowly  or fractures that require slow alteration in alignment.  HOME CARE INSTRUCTIONS  You should examine your external fixator every day. Look for any loose pins. Pain at the site where the pin exits the skin or the site of the break could be a sign of looseness. Make sure the nuts are tight. Your caregiver may show you how to tighten the nuts. However, do not make any adjustments to your external fixator, unless instructed otherwise by your caregiver.   Clean the frame of the fixator twice each week with a 4-inch square gauze, moistened with rubbing alcohol and water. After cleaning the frame, wipe it dry with a towel. Use a new gauze and a clean towel for each cleaning. Once it is okay for you to shower, you can clean the frame in the shower.  Clean the pin sites twice each day. These are the spots where the pins go through your skin and into your bone. To clean the pin sites, you will need the following items:  · Hydrogen peroxide.  · Saline solution.  · Sterile cotton swabs.  · Sterile gauze.  Wash your hands carefully. Use antibacterial soap. Mix a cleaning solution of equal amounts of hydrogen peroxide and saline solution in a sterile container. Dip a cotton swab in the mixture. Swab around the base of the pin, where it meets your skin. Use a circular motion. If your skin has moved up onto the pin, gently push it back down. Remove any crusts that have formed on the pin. After you have swabbed the base of the pin, clean the rest of the pin. Use a new swab for each pin. Dry each pin site with a clean cotton swab.   SEEK IMMEDIATE MEDICAL CARE IF:   · A pin becomes loose or moves.  · You have pain at any pin site.  · A pin site becomes red or swollen, or fluid leaks from the pin site.  · You have increasing pain where your bone was broken.     This information is not intended to replace advice given to you by your health care provider. Make sure you discuss any questions you have with your health care provider.      Document Released: 08/29/2012 Document Revised: 03/11/2013 Document Reviewed: 08/29/2012  [employer] Interactive Patient Education ©2016 [employer]      Depression / Suicide Risk    As you are discharged from this [facility] facility, it is important to learn how to keep safe from harming yourself.    Recognize the warning signs:  · Abrupt changes in personality, positive or negative- including increase in energy   · Giving away possessions  · Change in eating patterns- significant weight changes-  positive or negative  · Change in sleeping patterns- unable to sleep or sleeping all the time   · Unwillingness or inability to communicate  · Depression  · Unusual sadness, discouragement and loneliness  · Talk of wanting to die  · Neglect of personal appearance   · Rebelliousness- reckless behavior  · Withdrawal from people/activities they love  · Confusion- inability to concentrate     If you or a loved one observes any of these behaviors or has concerns about self-harm, here's what you can do:  · Talk about it- your feelings and reasons for harming yourself  · Remove any means that you might use to hurt yourself (examples: pills, rope, extension cords, firearm)  · Get professional help from the community (Mental Health, Substance Abuse, psychological counseling)  · Do not be alone:Call your Safe Contact- someone whom you trust who will be there for you.  · Call your local CRISIS HOTLINE 551-6562 or 819-297-4941  · Call your local Children's Mobile Crisis Response Team [place] (002) 439-8243 or www.[employer]  · Call the toll free National Suicide Prevention Hotlines   · National Suicide Prevention Lifeline 723-720-UDLF (6199)  · National Hope Line Network 800-SUICIDE (731-8980)

## 2017-11-13 NOTE — PROGRESS NOTES
Patient discharged at 1824, patient escorted by ELVA Zamudio via wheelchair to home with crutches, patient will make follow up appointment with Dr. Naik in one week. Reviewed discharge instructions with patient and family, verbalized understanding. Reviewed prescriptions for home, patient verbalized understanding, no questions at this time. Sent patient home with dressing supplies for pin care and to change dressing as needed.

## 2017-11-15 ENCOUNTER — HOSPITAL ENCOUNTER (OUTPATIENT)
Facility: MEDICAL CENTER | Age: 37
End: 2017-11-15
Attending: ORTHOPAEDIC SURGERY | Admitting: ORTHOPAEDIC SURGERY
Payer: COMMERCIAL

## 2017-11-15 NOTE — CONSULTS
DATE OF SERVICE:  11/11/2017    CHIEF COMPLAINT:  Left ankle pain.    HISTORY OF PRESENT ILLNESS:  The patient is a 37-year-old female who went   snowboarding first time today at [facility] when she lost control and crashed   into a chair lift tower bending her board with immediate severe left ankle   pain and inability to bear weight.  She was brought to [facility] where she was found to have a left distal tibia pilon fracture.  I was   consulted as the orthopedic surgeon on call.  Upon seeing the patient, she   states her pain in her ankle is approximately 5/10 in severity, it is an   aching pain radiating towards the foot.  She denies pain elsewhere in her   body.  She denies any numbness in the foot.  She denies prior trauma or   surgery about the foot.  Her pain has been somewhat amenable to splint, ice,   and pain medication.    PAST MEDICAL HISTORY:  Endometriosis.    PAST SURGICAL HISTORY:  Laparoscopy.    MEDICATIONS:  None.    ALLERGIES:  No known drug allergies.    FAMILY HISTORY:  Negative for bleeding disorders or anesthetic reactions.    SOCIAL HISTORY:  She lives locally.  She denies tobacco, alcohol, or drug use.    REVIEW OF SYSTEMS:  Thorough review of systems is performed and is negative   except for past medical history and history of present illness.    PHYSICAL EXAMINATION:  GENERAL:  She is well-appearing.  HEENT:  Normocephalic, atraumatic.  Cranial nerves II-XII are grossly intact.  CARDIOVASCULAR:  2+ distal pulses.  EXTREMITIES:  No cyanosis, clubbing, or edema.  PULMONARY:  Breathing comfortably on room air without labor.  ABDOMEN:  Flat and unremarkable.  SKIN:  No rashes, jaundice, or cyanosis.  SPINE:  Clinically midline.  GAIT:  Currently nonambulatory in a hospital bed.  MUSCULOSKELETAL:  Bilateral upper extremities and right lower extremity, no   tenderness to palpation, pain with range of motion.  Left lower extremity, she   has no tenderness to palpation  whatsoever about her thigh or knee.  Her leg   compartments are soft.  She has no pain with passive stretch of her toes.    Moderate soft tissue swelling, but no blisters or lacerations.  NEUROLOGIC:  Sensation is intact to light touch in L4, L5, S1 dermatomes.    IMAGING:  Multiple views of the left ankle demonstrate a comminuted distal   tibia fracture, pilon in nature with distal fibula fracture and mild   displacement.    ASSESSMENT:  A 37-year-old female with a left comminuted intraarticular distal   tibia fracture and distal fibula fractures.    PLAN:  I educated the patient regarding diagnosis.  We discussed conservative   versus operative treatment.  She understands my recommendation, given the   propensity for this fracture to swell significantly, for left ankle external   fixator placement and referral to one of our foot and ankle specialists for   definitive open reduction and internal fixation electively.  She understands   risks, benefits, alternatives of the procedure and wishes to proceed.       ____________________________________     MD MISHEL Henriquez / MOO    DD:  11/15/2017 08:42:38  DT:  11/15/2017 09:22:28    D#:  [id number]  Job#:  [id number]

## 2017-11-15 NOTE — OP REPORT
DATE OF SERVICE:  11/11/2017    SURGEON:  Ino Smith MD    ASSISTANT:  Jon Marks PA-C    PREOPERATIVE DIAGNOSIS:  Left distal tibia and fibula fracture.    POSTOPERATIVE DIAGNOSIS:  Left distal tibia and fibula fracture.    PROCEDURE:  Left ankle external fixator placement.    ANESTHESIOLOGIST:  Dilshad Bowers MD    ANESTHESIA:  General.    COMPLICATIONS:  None noted.    DRAINS:  None.    SPECIMENS:  None.    ESTIMATED BLOOD LOSS:  Minimal.    INDICATIONS:  The patient is a 37-year-old female status post crash   snowboarding with the above-mentioned injury.  She understands recommendation   temporize her fracture with external fixator prior to open reduction and   internal fixation by foot specialist for given the severe propensity for this   fracture to swell.  Prior to the procedure, she understood the risks, benefits   and alternatives to surgery.  She understood the risks to include, but not   limited to the risk of infection requiring repeat surgery, bleeding requiring   blood transfusion, nerve, blood vessel, tendon injury requiring repair,   chronic pain, chronic regional pain syndrome, posttraumatic arthritis   requiring salvage procedure, DVT, pulmonary embolism, heart attack, stroke,   and even death.  Patient states despite these risks, she wished to proceed   with surgery.    DESCRIPTION OF PROCEDURE:  Patient was met in the preoperative holding area.    Left ankle was initialed as correct operative site.  She had an opportunity to   ask questions, all questions were answered and informed consent was obtained.    Patient was brought to the operating room and laid supine on the operating   table.  All bony and dependent prominences were well padded.  General   anesthesia was induced without any complication.  Ancef was administered for   infection prophylaxis.  Left lower extremity was prepped and draped in the   usual sterile fashion.  A formal time-out was performed, which all  parties   agreed upon the correct patient, procedure, and operative site.  I began the   procedure by making a small 1 cm incision in the diaphysis of the tibia just   proximal to likely location of the plate and placed two 5 mm Schanz pins in   the tibia with a Smith and Nephew external fixator set.  One calcaneal pin was   placed from mediolateral in the inferior aspect of the calcaneus and then   placed clamps and carbon fiber bars.  Under fluoroscopy, I reduced the   fracture into reasonable alignment in the coronal and sagittal planes and   tightened the bars and confirmed reasonable alignment.  I then covered the pin   sites with Betadine sponge and covered with sterile Kerlix.  The patient was   transferred in stable condition to PACU where she had no immediate post-term   complaint.    POSTOPERATIVE PLAN:  The patient will be nonweightbearing, admitted for pain   control and likely discharged home when pain is controlled.    FOLLOWUP:  She is to follow up as an outpatient with Dr. Naik.  Also note,   during the duration of her surgery, her compartments remain soft.       ____________________________________     MD MISHEL Henriquez / MOO    DD:  11/15/2017 08:45:19  DT:  11/15/2017 09:26:19    D#:  [id number]  Job#:  [id number]

## 2019-08-02 NOTE — PROGRESS NOTES
Orders placed   Report received from day RN, and care assumed. Pt A&O x 4. Pt denies N/V. Pt reports pain level is tolerable at 5/10 and has been refusing pain medication when offered. Pt states she is nervous about becoming nauseous when taking pain medication. I told her that I could get an order for an antinausea medication and premedicate her, however she states that she is managing her pain and continues to decline pain medication. Pt was able to ambulate to the bathroom and void post-op. Dressing to the Lt ankle reinforced with additional gauze after bloody drainage was noted. Pt has been bradycardic and mildly hypotensive, but is now trending up. Still awaiting lab results. IV assessed and patent, with dressing CDI.  No set discharge plan yet. POC discussed with Pt and questions answered. POC includes pain control, rest, up for breakfast, and post-op ambulation and voiding. Bed is in the lowest position, rails are up, and call light is within reach. Communication board updated and hourly rounding implemented

## 2020-07-22 ENCOUNTER — HOSPITAL ENCOUNTER (OUTPATIENT)
Facility: MEDICAL CENTER | Age: 40
End: 2020-07-22
Attending: NURSE PRACTITIONER
Payer: COMMERCIAL

## 2020-07-22 ENCOUNTER — OFFICE VISIT (OUTPATIENT)
Dept: OCCUPATIONAL MEDICINE | Facility: CLINIC | Age: 40
End: 2020-07-22

## 2020-07-22 ENCOUNTER — EH NON-PROVIDER (OUTPATIENT)
Dept: OCCUPATIONAL MEDICINE | Facility: CLINIC | Age: 40
End: 2020-07-22

## 2020-07-22 VITALS
HEIGHT: 65 IN | OXYGEN SATURATION: 97 % | TEMPERATURE: 98.7 F | BODY MASS INDEX: 21.49 KG/M2 | WEIGHT: 129 LBS | HEART RATE: 85 BPM | DIASTOLIC BLOOD PRESSURE: 72 MMHG | SYSTOLIC BLOOD PRESSURE: 118 MMHG | RESPIRATION RATE: 16 BRPM

## 2020-07-22 DIAGNOSIS — Z02.89 VISIT FOR OCCUPATIONAL HEALTH EXAMINATION: ICD-10-CM

## 2020-07-22 DIAGNOSIS — Z02.1 PRE-EMPLOYMENT HEALTH SCREENING EXAMINATION: ICD-10-CM

## 2020-07-22 DIAGNOSIS — Z02.89 VISIT FOR OCCUPATIONAL HEALTH EXAMINATION: Primary | ICD-10-CM

## 2020-07-22 LAB
AMP AMPHETAMINE: NORMAL
BAR BARBITURATES: NORMAL
BZO BENZODIAZEPINES: NORMAL
COC COCAINE: NORMAL
INT CON NEG: NORMAL
INT CON POS: NORMAL
MDMA ECSTASY: NORMAL
MET METHAMPHETAMINES: NORMAL
MTD METHADONE: NORMAL
OPI OPIATES: NORMAL
OXY OXYCODONE: NORMAL
PCP PHENCYCLIDINE: NORMAL
POC URINE DRUG SCREEN OCDRS: NORMAL
THC: NORMAL

## 2020-07-22 PROCEDURE — 86480 TB TEST CELL IMMUN MEASURE: CPT | Performed by: PREVENTIVE MEDICINE

## 2020-07-22 PROCEDURE — 80305 DRUG TEST PRSMV DIR OPT OBS: CPT | Performed by: NURSE PRACTITIONER

## 2020-07-22 PROCEDURE — 90707 MMR VACCINE SC: CPT | Performed by: NURSE PRACTITIONER

## 2020-07-22 PROCEDURE — 8915 PR COMPREHENSIVE PHYSICAL: Performed by: NURSE PRACTITIONER

## 2020-07-24 LAB
GAMMA INTERFERON BACKGROUND BLD IA-ACNC: 0.02 IU/ML
M TB IFN-G BLD-IMP: NEGATIVE
M TB IFN-G CD4+ BCKGRND COR BLD-ACNC: 0 IU/ML
MITOGEN IGNF BCKGRD COR BLD-ACNC: >10 IU/ML
QFT TB2 - NIL TBQ2: 0 IU/ML

## 2020-07-27 ENCOUNTER — EH NON-PROVIDER (OUTPATIENT)
Dept: OCCUPATIONAL MEDICINE | Facility: CLINIC | Age: 40
End: 2020-07-27

## 2020-07-27 DIAGNOSIS — Z71.85 IMMUNIZATION COUNSELING: ICD-10-CM

## 2020-07-27 PROCEDURE — 99999 PR NO CHARGE: CPT | Performed by: PHYSICIAN ASSISTANT

## 2020-09-25 ENCOUNTER — IMMUNIZATION (OUTPATIENT)
Dept: OCCUPATIONAL MEDICINE | Facility: CLINIC | Age: 40
End: 2020-09-25

## 2020-09-25 DIAGNOSIS — Z23 NEED FOR VACCINATION: ICD-10-CM

## 2020-09-25 PROCEDURE — 90686 IIV4 VACC NO PRSV 0.5 ML IM: CPT | Performed by: NURSE PRACTITIONER

## 2020-12-18 ENCOUNTER — PHARMACY VISIT (OUTPATIENT)
Dept: PHARMACY | Facility: MEDICAL CENTER | Age: 40
End: 2020-12-18

## 2020-12-18 PROCEDURE — RXOTC WILLOW AMBULATORY OTC CHARGE

## 2020-12-20 DIAGNOSIS — Z23 NEED FOR VACCINATION: ICD-10-CM

## 2020-12-30 ENCOUNTER — IMMUNIZATION (OUTPATIENT)
Dept: FAMILY PLANNING/WOMEN'S HEALTH CLINIC | Facility: IMMUNIZATION CENTER | Age: 40
End: 2020-12-30
Attending: FAMILY MEDICINE

## 2020-12-30 DIAGNOSIS — Z23 NEED FOR VACCINATION: ICD-10-CM

## 2020-12-30 DIAGNOSIS — Z23 ENCOUNTER FOR VACCINATION: Primary | ICD-10-CM

## 2020-12-30 PROCEDURE — 0011A MODERNA SARS-COV-2 VACCINE: CPT

## 2020-12-30 PROCEDURE — 91301 MODERNA SARS-COV-2 VACCINE: CPT

## 2021-01-29 ENCOUNTER — HOSPITAL ENCOUNTER (OUTPATIENT)
Facility: MEDICAL CENTER | Age: 41
End: 2021-01-29
Attending: DERMATOLOGY
Payer: COMMERCIAL

## 2021-01-29 ENCOUNTER — OFFICE VISIT (OUTPATIENT)
Dept: DERMATOLOGY | Facility: IMAGING CENTER | Age: 41
End: 2021-01-29
Payer: COMMERCIAL

## 2021-01-29 DIAGNOSIS — D49.2 NEOPLASM OF SKIN: ICD-10-CM

## 2021-01-29 DIAGNOSIS — L81.4 LENTIGO: ICD-10-CM

## 2021-01-29 DIAGNOSIS — Z12.83 SKIN CANCER SCREENING: ICD-10-CM

## 2021-01-29 DIAGNOSIS — D22.9 NEVUS: ICD-10-CM

## 2021-01-29 DIAGNOSIS — L73.8 SEBACEOUS HYPERPLASIA: ICD-10-CM

## 2021-01-29 DIAGNOSIS — L82.1 SEBORRHEIC KERATOSIS: ICD-10-CM

## 2021-01-29 DIAGNOSIS — L90.8 SKIN AGING: ICD-10-CM

## 2021-01-29 PROCEDURE — 11102 TANGNTL BX SKIN SINGLE LES: CPT | Performed by: DERMATOLOGY

## 2021-01-29 PROCEDURE — 99203 OFFICE O/P NEW LOW 30 MIN: CPT | Mod: 25 | Performed by: DERMATOLOGY

## 2021-01-29 PROCEDURE — 88305 TISSUE EXAM BY PATHOLOGIST: CPT

## 2021-01-29 NOTE — PROGRESS NOTES
CC:  CHRIS    Subjective: new patient here for CHRIS     Last exam 4 years ago   HPI: skin lesion   Location: right side of nose   Time present: 1 year   Painful lesion: No  Itching lesion: No  Enlarging lesion: No  Anything make it better or worse?no   Shrunk a little when pustule formed overlying and patient squeezed to release contents.     HPI: discoloration spot on left cheek    Time present: over a year   Painful lesion: No  Itching lesion: No  Enlarging lesion: No  Anything make it better or worse?no     Spot on right groin, rubbed by underwear.  Desires removal.      History of skin cancer: No  History of biopsies:No  History of blistering/severe sunburns:No  Family history of skin cancer:Yes, Details: father melanoma   Family history of atypical moles:No    Tobacco use: Never  Alcohol use:denies alcohol consumption  Allergies:No    ROS: no fevers/chills. No itch.  No Cough  DermPMH: no skin cancer/melanoma  No problem-specific Assessment & Plan notes found for this encounter.    Relevant PMH: NC    PE: Gen:WDWN female in NAD.  Skin: Scalp/face/eyes/lips/neck/chest/back/arms/legs/hands/feet/buttocks - without suspicious lesions noted.  Genitals exam declined  -yellow-hued papule on right cheek 1-2mm, appearing benign  -tan macule on left cheek, appearing to have pseudohorn cysts present, appearing benign  -scattered hyperpigmented macules/papules, appearing benign on torso and extremities  -fleshy papule right groin, appearing benign    A/P: seb hyperplasia, likely, right cheek:  -observe for changes/growth/suspicious signs/sx and RTC PRN    Nevi: benign appearing:  -Reviewed ABCDEs  -Reviewed skin cancer detection/prevention  -RTC PRN growth/changes/concerning features    Lentigos/SKs: benign  -reassurance  -reviewed skin cancer detection/prevention    Neoplasm NOS: nevus right groin  -consent for bx, including R/B/A. Cleaned with EtOH, anesthesia with lidocaine 1% + epinephrine, shave bx, AlCl3 for  hemostasis  -vaseline/bandage and wound care reviewed      I have reviewed medications relevant to my specialty.      F/u PRN

## 2021-02-01 LAB — PATHOLOGY CONSULT NOTE: NORMAL

## 2021-03-12 ENCOUNTER — PHARMACY VISIT (OUTPATIENT)
Dept: PHARMACY | Facility: MEDICAL CENTER | Age: 41
End: 2021-03-12

## 2021-03-12 PROCEDURE — RXOTC WILLOW AMBULATORY OTC CHARGE

## 2021-03-14 ENCOUNTER — PHARMACY VISIT (OUTPATIENT)
Dept: PHARMACY | Facility: MEDICAL CENTER | Age: 41
End: 2021-03-14

## 2021-03-14 PROCEDURE — RXOTC WILLOW AMBULATORY OTC CHARGE

## 2021-03-26 ENCOUNTER — PHARMACY VISIT (OUTPATIENT)
Dept: PHARMACY | Facility: MEDICAL CENTER | Age: 41
End: 2021-03-26

## 2021-03-26 PROCEDURE — RXOTC WILLOW AMBULATORY OTC CHARGE

## 2021-04-08 ENCOUNTER — IMMUNIZATION (OUTPATIENT)
Dept: OTHER | Facility: MEDICAL CENTER | Age: 41
End: 2021-04-08
Payer: COMMERCIAL

## 2021-04-08 DIAGNOSIS — Z23 ENCOUNTER FOR VACCINATION: Primary | ICD-10-CM

## 2021-04-08 PROCEDURE — 0012A MODERNA SARS-COV-2 VACCINE: CPT

## 2021-04-08 PROCEDURE — 91301 MODERNA SARS-COV-2 VACCINE: CPT

## 2021-04-23 ENCOUNTER — HOSPITAL ENCOUNTER (OUTPATIENT)
Dept: RADIOLOGY | Facility: MEDICAL CENTER | Age: 41
End: 2021-04-23
Attending: OBSTETRICS & GYNECOLOGY
Payer: COMMERCIAL

## 2021-04-23 DIAGNOSIS — Z12.31 VISIT FOR SCREENING MAMMOGRAM: ICD-10-CM

## 2021-04-23 PROCEDURE — 77063 BREAST TOMOSYNTHESIS BI: CPT

## 2021-04-28 ENCOUNTER — HOSPITAL ENCOUNTER (OUTPATIENT)
Dept: RADIOLOGY | Facility: MEDICAL CENTER | Age: 41
End: 2021-04-28
Payer: COMMERCIAL

## 2021-04-30 ENCOUNTER — HOSPITAL ENCOUNTER (OUTPATIENT)
Dept: RADIOLOGY | Facility: MEDICAL CENTER | Age: 41
End: 2021-04-30
Attending: OBSTETRICS & GYNECOLOGY
Payer: COMMERCIAL

## 2021-04-30 DIAGNOSIS — R92.8 ABNORMAL MAMMOGRAM: ICD-10-CM

## 2021-04-30 PROCEDURE — 77065 DX MAMMO INCL CAD UNI: CPT | Mod: RT

## 2021-05-04 ENCOUNTER — PHARMACY VISIT (OUTPATIENT)
Dept: PHARMACY | Facility: MEDICAL CENTER | Age: 41
End: 2021-05-04

## 2021-05-04 PROCEDURE — RXOTC WILLOW AMBULATORY OTC CHARGE

## 2021-05-06 ENCOUNTER — HOSPITAL ENCOUNTER (OUTPATIENT)
Dept: LAB | Facility: MEDICAL CENTER | Age: 41
End: 2021-05-06
Attending: OBSTETRICS & GYNECOLOGY
Payer: COMMERCIAL

## 2021-05-06 LAB
ANION GAP SERPL CALC-SCNC: 9 MMOL/L (ref 7–16)
BASOPHILS # BLD AUTO: 1.3 % (ref 0–1.8)
BASOPHILS # BLD: 0.06 K/UL (ref 0–0.12)
BUN SERPL-MCNC: 11 MG/DL (ref 8–22)
CALCIUM SERPL-MCNC: 9.6 MG/DL (ref 8.5–10.5)
CHLORIDE SERPL-SCNC: 98 MMOL/L (ref 96–112)
CHOLEST SERPL-MCNC: 204 MG/DL (ref 100–199)
CO2 SERPL-SCNC: 27 MMOL/L (ref 20–33)
CREAT SERPL-MCNC: 0.96 MG/DL (ref 0.5–1.4)
EOSINOPHIL # BLD AUTO: 0.47 K/UL (ref 0–0.51)
EOSINOPHIL NFR BLD: 9.9 % (ref 0–6.9)
ERYTHROCYTE [DISTWIDTH] IN BLOOD BY AUTOMATED COUNT: 41.8 FL (ref 35.9–50)
FSH SERPL-ACNC: 106 MIU/ML
GLUCOSE SERPL-MCNC: 80 MG/DL (ref 65–99)
HCT VFR BLD AUTO: 43.1 % (ref 37–47)
HDLC SERPL-MCNC: 96 MG/DL
HGB BLD-MCNC: 14.4 G/DL (ref 12–16)
IMM GRANULOCYTES # BLD AUTO: 0.01 K/UL (ref 0–0.11)
IMM GRANULOCYTES NFR BLD AUTO: 0.2 % (ref 0–0.9)
LDLC SERPL CALC-MCNC: 97 MG/DL
LYMPHOCYTES # BLD AUTO: 1.75 K/UL (ref 1–4.8)
LYMPHOCYTES NFR BLD: 36.8 % (ref 22–41)
MCH RBC QN AUTO: 33.5 PG (ref 27–33)
MCHC RBC AUTO-ENTMCNC: 33.4 G/DL (ref 33.6–35)
MCV RBC AUTO: 100.2 FL (ref 81.4–97.8)
MONOCYTES # BLD AUTO: 0.33 K/UL (ref 0–0.85)
MONOCYTES NFR BLD AUTO: 6.9 % (ref 0–13.4)
NEUTROPHILS # BLD AUTO: 2.13 K/UL (ref 2–7.15)
NEUTROPHILS NFR BLD: 44.9 % (ref 44–72)
NRBC # BLD AUTO: 0 K/UL
NRBC BLD-RTO: 0 /100 WBC
PLATELET # BLD AUTO: 245 K/UL (ref 164–446)
PMV BLD AUTO: 10.3 FL (ref 9–12.9)
POTASSIUM SERPL-SCNC: 3.8 MMOL/L (ref 3.6–5.5)
RBC # BLD AUTO: 4.3 M/UL (ref 4.2–5.4)
SODIUM SERPL-SCNC: 134 MMOL/L (ref 135–145)
TRIGL SERPL-MCNC: 55 MG/DL (ref 0–149)
TSH SERPL DL<=0.005 MIU/L-ACNC: 2.05 UIU/ML (ref 0.38–5.33)
WBC # BLD AUTO: 4.8 K/UL (ref 4.8–10.8)

## 2021-05-06 PROCEDURE — 82306 VITAMIN D 25 HYDROXY: CPT

## 2021-05-06 PROCEDURE — 84443 ASSAY THYROID STIM HORMONE: CPT

## 2021-05-06 PROCEDURE — 80061 LIPID PANEL: CPT

## 2021-05-06 PROCEDURE — 36415 COLL VENOUS BLD VENIPUNCTURE: CPT

## 2021-05-06 PROCEDURE — 83001 ASSAY OF GONADOTROPIN (FSH): CPT

## 2021-05-06 PROCEDURE — 85025 COMPLETE CBC W/AUTO DIFF WBC: CPT

## 2021-05-06 PROCEDURE — 80048 BASIC METABOLIC PNL TOTAL CA: CPT

## 2021-05-08 LAB — 25(OH)D3 SERPL-MCNC: 24 NG/ML (ref 30–80)

## 2021-05-12 ENCOUNTER — HOSPITAL ENCOUNTER (OUTPATIENT)
Dept: RADIOLOGY | Facility: MEDICAL CENTER | Age: 41
End: 2021-05-12
Attending: OBSTETRICS & GYNECOLOGY
Payer: COMMERCIAL

## 2021-05-12 DIAGNOSIS — R92.8 ABNORMAL FINDINGS ON DIAGNOSTIC IMAGING OF BREAST: ICD-10-CM

## 2021-05-12 LAB — PATHOLOGY CONSULT NOTE: NORMAL

## 2021-05-12 PROCEDURE — 77065 DX MAMMO INCL CAD UNI: CPT | Mod: RT

## 2021-05-12 PROCEDURE — 88305 TISSUE EXAM BY PATHOLOGIST: CPT

## 2021-09-06 ENCOUNTER — OFFICE VISIT (OUTPATIENT)
Dept: URGENT CARE | Facility: CLINIC | Age: 41
End: 2021-09-06
Payer: COMMERCIAL

## 2021-09-06 ENCOUNTER — APPOINTMENT (OUTPATIENT)
Dept: RADIOLOGY | Facility: IMAGING CENTER | Age: 41
End: 2021-09-06
Attending: PHYSICIAN ASSISTANT
Payer: COMMERCIAL

## 2021-09-06 VITALS
DIASTOLIC BLOOD PRESSURE: 74 MMHG | SYSTOLIC BLOOD PRESSURE: 96 MMHG | WEIGHT: 124.4 LBS | HEART RATE: 64 BPM | OXYGEN SATURATION: 95 % | RESPIRATION RATE: 14 BRPM | TEMPERATURE: 97.9 F | BODY MASS INDEX: 20.73 KG/M2 | HEIGHT: 65 IN

## 2021-09-06 DIAGNOSIS — S29.9XXA TRAUMATIC INJURY OF RIB: ICD-10-CM

## 2021-09-06 DIAGNOSIS — S22.31XA CLOSED FRACTURE OF ONE RIB OF RIGHT SIDE, INITIAL ENCOUNTER: ICD-10-CM

## 2021-09-06 PROCEDURE — 71101 X-RAY EXAM UNILAT RIBS/CHEST: CPT | Mod: TC,RT | Performed by: PHYSICIAN ASSISTANT

## 2021-09-06 PROCEDURE — 99203 OFFICE O/P NEW LOW 30 MIN: CPT | Performed by: PHYSICIAN ASSISTANT

## 2021-09-06 PROCEDURE — RXMED WILLOW AMBULATORY MEDICATION CHARGE: Performed by: PHYSICIAN ASSISTANT

## 2021-09-06 RX ORDER — CYCLOBENZAPRINE HCL 10 MG
10 TABLET ORAL 3 TIMES DAILY PRN
Qty: 30 TABLET | Refills: 0 | Status: SHIPPED | OUTPATIENT
Start: 2021-09-06 | End: 2021-09-24

## 2021-09-06 ASSESSMENT — ENCOUNTER SYMPTOMS
COUGH: 0
MYALGIAS: 1
SHORTNESS OF BREATH: 0
FEVER: 0
CHILLS: 0
WHEEZING: 0
ARTHRALGIAS: 1
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
## 2021-09-07 ENCOUNTER — PHARMACY VISIT (OUTPATIENT)
Dept: PHARMACY | Facility: MEDICAL CENTER | Age: 41
End: 2021-09-07
Payer: COMMERCIAL

## 2021-09-11 ASSESSMENT — ENCOUNTER SYMPTOMS: SPUTUM PRODUCTION: 0

## 2021-09-23 ENCOUNTER — IMMUNIZATION (OUTPATIENT)
Dept: OCCUPATIONAL MEDICINE | Facility: CLINIC | Age: 41
End: 2021-09-23
Payer: COMMERCIAL

## 2021-09-23 DIAGNOSIS — Z23 NEED FOR VACCINATION: Primary | ICD-10-CM

## 2021-09-23 PROCEDURE — 90686 IIV4 VACC NO PRSV 0.5 ML IM: CPT | Performed by: PREVENTIVE MEDICINE

## 2021-09-24 ENCOUNTER — OFFICE VISIT (OUTPATIENT)
Dept: MEDICAL GROUP | Facility: PHYSICIAN GROUP | Age: 41
End: 2021-09-24
Payer: COMMERCIAL

## 2021-09-24 VITALS
RESPIRATION RATE: 16 BRPM | WEIGHT: 125.4 LBS | DIASTOLIC BLOOD PRESSURE: 70 MMHG | TEMPERATURE: 97.6 F | HEART RATE: 74 BPM | SYSTOLIC BLOOD PRESSURE: 104 MMHG | OXYGEN SATURATION: 95 % | HEIGHT: 65 IN | BODY MASS INDEX: 20.89 KG/M2

## 2021-09-24 DIAGNOSIS — D75.89 MACROCYTOSIS: ICD-10-CM

## 2021-09-24 DIAGNOSIS — K58.1 IRRITABLE BOWEL SYNDROME WITH CONSTIPATION: ICD-10-CM

## 2021-09-24 DIAGNOSIS — N95.1 PERIMENOPAUSAL: ICD-10-CM

## 2021-09-24 DIAGNOSIS — R09.89 JUGULAR VENOUS DISTENSION: ICD-10-CM

## 2021-09-24 PROBLEM — S82.892A CLOSED LEFT ANKLE FRACTURE: Status: RESOLVED | Noted: 2017-11-11 | Resolved: 2021-09-24

## 2021-09-24 PROBLEM — L03.90 CELLULITIS: Status: RESOLVED | Noted: 2017-06-11 | Resolved: 2021-09-24

## 2021-09-24 PROBLEM — N28.9 RENAL INSUFFICIENCY: Status: RESOLVED | Noted: 2017-06-13 | Resolved: 2021-09-24

## 2021-09-24 PROCEDURE — 99203 OFFICE O/P NEW LOW 30 MIN: CPT | Performed by: FAMILY MEDICINE

## 2021-09-24 ASSESSMENT — PATIENT HEALTH QUESTIONNAIRE - PHQ9: CLINICAL INTERPRETATION OF PHQ2 SCORE: 0

## 2021-09-24 NOTE — ASSESSMENT & PLAN NOTE
This is a chronic condition, stable.  She has been able to control the constipation fairly well with celery juice in the morning, fiber, and probiotics.  She will use magnesium as needed for constipation and she will continue this regimen.

## 2021-09-24 NOTE — ASSESSMENT & PLAN NOTE
This is a chronic condition.  She has been perimenopausal for approximately 2 years her LMP is in January, 2021.  She has been getting hot flashes, sleep disturbances, and anxiety.  She would be safe to use HRT based on the low ASCVD and 5-year breast cancer risk but at this time we decided to hold off on hormones.  She is can try over-the-counter options first and if that fails then we can consider SSRIs second.

## 2021-09-24 NOTE — ASSESSMENT & PLAN NOTE
This is a chronic condition.  For some time now she has noticed some jugular distention on the right side of the neck only with certain positions.  If she lays on her right side in bed or raises her right hand above her head it becomes distended.  It and she brings a hand down or lays on the left side it flattens and its not noticeable.  She denies any symptoms of CHF.  I cannot find any other condition that explain jugular venous distention is not related to the heart.  Since it is so positional I suspect this is an anatomical variant where she pinches the vein causing it to distend and is soon if she changes position blood flow smoothly again and it flattens.  I did tell her I would do further research and I will message her if I find anything else.

## 2021-09-24 NOTE — PROGRESS NOTES
Subjective:     CC:  Diagnoses of Irritable bowel syndrome with constipation, Macrocytosis, Perimenopausal, and Jugular venous distension were pertinent to this visit.    HISTORY OF THE PRESENT ILLNESS: Patient is a 41 y.o. female. This pleasant patient is here today to establish care. Her prior PCP was George Ta MD.    Problem   Perimenopausal    This is a new condition.  Onset: 2 years  LMP: 1/2021  Menstrual history: menarche at 16 yo; regular  Symptoms: +hot flashes, +sleep disturbances, +anxiety, no depression, no vaginal dryness  HRT: safe to use - ASCVD 0.2%, 5 year breast cancer risk 1.4%   OTC tried: none       Jugular Venous Distension    On the right side, if she raises her hand above her head she finds the jugular gets distended. It will flatten if she lays on her left side. She has also noticed it with her exercise class. No orthopnea, no paroxysmal nocturnal dyspnea, no pedal edema.      Macrocytosis    This is a chronic condition. Since at least 2005, elevated MCV. B12 and folate not deficient in 2017. She doesn't drink alcohol, maybe 2 drinks per year. TSH wnl. No back pain, no hypercalcemia, or renal insufficiency.     Irritable Bowel Syndrome With Constipation    This is a chronic condition. She largely get constipation. She drinks celery juice in the morning, fiber, and probiotics. Largely controlled, sometimes magnesium as needed.     Closed Left Ankle Fracture (Resolved)   Renal Insufficiency (Resolved)   Cellulitis (Resolved)         No current Epic-ordered outpatient medications on file.     No current [facility]-ordered facility-administered medications on file.       Health Maintenance: requesting records    ROS:   Gen: no fevers/chills  Eyes: no changes in vision  ENT: no sore throat  Pulm: no sob  CV: no chest pain  GI: no nausea/vomiting  : no dysuria  MSk: no myalgias  Skin: no rash  Neuro: no headaches     Objective:     Exam: /70 (BP Location: Right arm, Patient Position: Sitting,  "BP Cuff Size: Adult)   Pulse 74   Temp 36.4 °C (97.6 °F) (Temporal)   Resp 16   Ht 1.651 m (5' 5\")   Wt 56.9 kg (125 lb 6.4 oz)   SpO2 95%  Body mass index is 20.87 kg/m².    General: Normal appearing. No distress.  HEENT: Normocephalic. Eyes conjunctiva clear lids without ptosis, pupils equal and reactive to light accommodation, ears normal shape and contour, canals are clear bilaterally, tympanic membranes are benign, oropharynx is without erythema, edema or exudates.   Neck: Supple. Thyroid is not enlarged. +JVD on right if R hand above head.  Pulmonary: Clear to ausculation.  Normal effort. No rales, ronchi, or wheezing.  Cardiovascular: Regular rate and rhythm without murmur. Carotid and radial pulses are intact and equal bilaterally.  Abdomen: Soft, nontender, nondistended. Normal bowel sounds. Liver and spleen are not palpable  Neurologic: Grossly nonfocal  Lymph: No cervical or supraclavicular lymph nodes are palpable  Skin: Warm and dry.  No obvious lesions.  Musculoskeletal: Normal gait. No extremity cyanosis, clubbing, or edema.  Psych: Normal mood and affect. Alert and oriented x3. Judgment and insight is normal.  Assessment & Plan:   41 y.o. female with the following -    Problem List Items Addressed This Visit     Irritable bowel syndrome with constipation     This is a chronic condition, stable.  She has been able to control the constipation fairly well with celery juice in the morning, fiber, and probiotics.  She will use magnesium as needed for constipation and she will continue this regimen.         Macrocytosis     This is a chronic condition, stable.  Since at least 2005 she has had macrocytosis.  There is no B12 or folate deficiency.  She does not drink alcohol excessively.  No thyroid conditions.  No symptoms suggestive of myelodysplastic disease.  Therefore, no further work-up is required and we will continue to monitor.         Perimenopausal     This is a chronic condition.  She has " been perimenopausal for approximately 2 years her LMP is in January, 2021.  She has been getting hot flashes, sleep disturbances, and anxiety.  She would be safe to use HRT based on the low ASCVD and 5-year breast cancer risk but at this time we decided to hold off on hormones.  She is can try over-the-counter options first and if that fails then we can consider SSRIs second.         Jugular venous distension     This is a chronic condition.  For some time now she has noticed some jugular distention on the right side of the neck only with certain positions.  If she lays on her right side in bed or raises her right hand above her head it becomes distended.  It and she brings a hand down or lays on the left side it flattens and its not noticeable.  She denies any symptoms of CHF.  I cannot find any other condition that explain jugular venous distention is not related to the heart.  Since it is so positional I suspect this is an anatomical variant where she pinches the vein causing it to distend and is soon if she changes position blood flow smoothly again and it flattens.  I did tell her I would do further research and I will message her if I find anything else.             I spent a total of 31 minutes with record review, exam, communication with the patient, and documentation of this encounter.    Return in about 6 months (around 3/24/2022) for Annual/wellness visit.    Please note that this dictation was created using voice recognition software. I have made every reasonable attempt to correct obvious errors, but I expect that there are errors of grammar and possibly content that I did not discover before finalizing the note.

## 2021-09-24 NOTE — ASSESSMENT & PLAN NOTE
This is a chronic condition, stable.  Since at least 2005 she has had macrocytosis.  There is no B12 or folate deficiency.  She does not drink alcohol excessively.  No thyroid conditions.  No symptoms suggestive of myelodysplastic disease.  Therefore, no further work-up is required and we will continue to monitor.

## 2021-09-24 NOTE — LETTER
[facility]  Joanna Vargas M.D.  [address]  [place] [address]  Fax: [phone]   Authorization for Release/Disclosure of   Protected Health Information   Name: AFSHAN RIVERO : 1980 SSN: [id number]   Address: [address] [place] [address] [address]  [place] [address] Phone:    [phone] (home) [phone] (work)   I authorize the entity listed below to release/disclose the PHI below to:   [facility]/Joanna Vargas M.D. and Joanna Vargas M.D.   Provider or Entity Name:  Dr. Lopez   Address   City, [facility], [facility]   Phone:      Fax:     Reason for request: continuity of care   Information to be released:    [  ] LAST COLONOSCOPY,  including any PATH REPORT and follow-up  [  ] LAST FIT/COLOGUARD RESULT [  ] LAST DEXA  [  ] LAST MAMMOGRAM  [XX] LAST PAP  [  ] LAST LABS [  ] RETINA EXAM REPORT  [  ] IMMUNIZATION RECORDS  [  ] Release all info      [  ] Check here and initial the line next to each item to release ALL health information INCLUDING  _____ Care and treatment for drug and / or alcohol abuse  _____ HIV testing, infection status, or AIDS  _____ Genetic Testing    DATES OF SERVICE OR TIME PERIOD TO BE DISCLOSED: _____________  I understand and acknowledge that:  * This Authorization may be revoked at any time by you in writing, except if your health information has already been used or disclosed.  * Your health information that will be used or disclosed as a result of you signing this authorization could be re-disclosed by the recipient. If this occurs, your re-disclosed health information may no longer be protected by State or Federal laws.  * You may refuse to sign this Authorization. Your refusal will not affect your ability to obtain treatment.  * This Authorization becomes effective upon signing and will  on (date) __________.      If no date is indicated, this Authorization will  one (1) year from the signature date.    Name: Afshan Rivero    Signature:   Date:      9/24/2021       PLEASE FAX REQUESTED RECORDS BACK TO: [phone]

## 2021-11-21 ENCOUNTER — PHARMACY VISIT (OUTPATIENT)
Dept: PHARMACY | Facility: MEDICAL CENTER | Age: 41
End: 2021-11-21

## 2021-11-21 PROCEDURE — RXOTC WILLOW AMBULATORY OTC CHARGE

## 2021-12-23 ENCOUNTER — OFFICE VISIT (OUTPATIENT)
Dept: URGENT CARE | Facility: CLINIC | Age: 41
End: 2021-12-23
Payer: COMMERCIAL

## 2021-12-23 VITALS
HEIGHT: 65 IN | DIASTOLIC BLOOD PRESSURE: 70 MMHG | HEART RATE: 58 BPM | OXYGEN SATURATION: 98 % | BODY MASS INDEX: 21.33 KG/M2 | TEMPERATURE: 98.4 F | SYSTOLIC BLOOD PRESSURE: 110 MMHG | WEIGHT: 128 LBS | RESPIRATION RATE: 14 BRPM

## 2021-12-23 DIAGNOSIS — S16.1XXA STRAIN OF NECK MUSCLE, INITIAL ENCOUNTER: ICD-10-CM
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
## 2021-12-23 PROCEDURE — 99213 OFFICE O/P EST LOW 20 MIN: CPT | Performed by: FAMILY MEDICINE

## 2021-12-23 RX ORDER — CYCLOBENZAPRINE HCL 10 MG
10 TABLET ORAL 3 TIMES DAILY PRN
Qty: 30 TABLET | Refills: 0 | Status: SHIPPED | OUTPATIENT
Start: 2021-12-23 | End: 2022-03-30
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
## 2022-01-30 PROCEDURE — RXMED WILLOW AMBULATORY MEDICATION CHARGE: Performed by: INTERNAL MEDICINE

## 2022-01-31 ENCOUNTER — PHARMACY VISIT (OUTPATIENT)
Dept: PHARMACY | Facility: MEDICAL CENTER | Age: 42
End: 2022-01-31
Payer: COMMERCIAL

## 2022-03-29 SDOH — ECONOMIC STABILITY: HOUSING INSECURITY
IN THE LAST 12 MONTHS, WAS THERE A TIME WHEN YOU DID NOT HAVE A STEADY PLACE TO SLEEP OR SLEPT IN A SHELTER (INCLUDING NOW)?: NO

## 2022-03-29 SDOH — ECONOMIC STABILITY: INCOME INSECURITY: IN THE LAST 12 MONTHS, WAS THERE A TIME WHEN YOU WERE NOT ABLE TO PAY THE MORTGAGE OR RENT ON TIME?: NO

## 2022-03-29 SDOH — ECONOMIC STABILITY: TRANSPORTATION INSECURITY
IN THE PAST 12 MONTHS, HAS LACK OF TRANSPORTATION KEPT YOU FROM MEETINGS, WORK, OR FROM GETTING THINGS NEEDED FOR DAILY LIVING?: NO

## 2022-03-29 SDOH — HEALTH STABILITY: MENTAL HEALTH
STRESS IS WHEN SOMEONE FEELS TENSE, NERVOUS, ANXIOUS, OR CAN'T SLEEP AT NIGHT BECAUSE THEIR MIND IS TROUBLED. HOW STRESSED ARE YOU?: ONLY A LITTLE

## 2022-03-29 SDOH — ECONOMIC STABILITY: FOOD INSECURITY: WITHIN THE PAST 12 MONTHS, THE FOOD YOU BOUGHT JUST DIDN'T LAST AND YOU DIDN'T HAVE MONEY TO GET MORE.: NEVER TRUE

## 2022-03-29 SDOH — ECONOMIC STABILITY: FOOD INSECURITY: WITHIN THE PAST 12 MONTHS, YOU WORRIED THAT YOUR FOOD WOULD RUN OUT BEFORE YOU GOT MONEY TO BUY MORE.: NEVER TRUE

## 2022-03-29 SDOH — HEALTH STABILITY: PHYSICAL HEALTH: ON AVERAGE, HOW MANY DAYS PER WEEK DO YOU ENGAGE IN MODERATE TO STRENUOUS EXERCISE (LIKE A BRISK WALK)?: 4 DAYS

## 2022-03-29 SDOH — HEALTH STABILITY: PHYSICAL HEALTH: ON AVERAGE, HOW MANY MINUTES DO YOU ENGAGE IN EXERCISE AT THIS LEVEL?: 50 MIN

## 2022-03-29 SDOH — ECONOMIC STABILITY: INCOME INSECURITY: HOW HARD IS IT FOR YOU TO PAY FOR THE VERY BASICS LIKE FOOD, HOUSING, MEDICAL CARE, AND HEATING?: NOT HARD AT ALL

## 2022-03-29 SDOH — ECONOMIC STABILITY: TRANSPORTATION INSECURITY
IN THE PAST 12 MONTHS, HAS THE LACK OF TRANSPORTATION KEPT YOU FROM MEDICAL APPOINTMENTS OR FROM GETTING MEDICATIONS?: NO

## 2022-03-29 SDOH — ECONOMIC STABILITY: HOUSING INSECURITY: IN THE LAST 12 MONTHS, HOW MANY PLACES HAVE YOU LIVED?: 1

## 2022-03-29 SDOH — ECONOMIC STABILITY: TRANSPORTATION INSECURITY
IN THE PAST 12 MONTHS, HAS LACK OF RELIABLE TRANSPORTATION KEPT YOU FROM MEDICAL APPOINTMENTS, MEETINGS, WORK OR FROM GETTING THINGS NEEDED FOR DAILY LIVING?: NO

## 2022-03-29 ASSESSMENT — SOCIAL DETERMINANTS OF HEALTH (SDOH)
ARE YOU MARRIED, WIDOWED, DIVORCED, SEPARATED, NEVER MARRIED, OR LIVING WITH A PARTNER?: NEVER MARRIED
HOW OFTEN DO YOU ATTENT MEETINGS OF THE CLUB OR ORGANIZATION YOU BELONG TO?: MORE THAN 4 TIMES PER YEAR
HOW OFTEN DO YOU ATTEND CHURCH OR RELIGIOUS SERVICES?: MORE THAN 4 TIMES PER YEAR
WITHIN THE PAST 12 MONTHS, YOU WORRIED THAT YOUR FOOD WOULD RUN OUT BEFORE YOU GOT THE MONEY TO BUY MORE: NEVER TRUE
HOW OFTEN DO YOU ATTENT MEETINGS OF THE CLUB OR ORGANIZATION YOU BELONG TO?: MORE THAN 4 TIMES PER YEAR
ARE YOU MARRIED, WIDOWED, DIVORCED, SEPARATED, NEVER MARRIED, OR LIVING WITH A PARTNER?: NEVER MARRIED
HOW OFTEN DO YOU HAVE A DRINK CONTAINING ALCOHOL: NEVER
HOW OFTEN DO YOU ATTEND CHURCH OR RELIGIOUS SERVICES?: MORE THAN 4 TIMES PER YEAR
DO YOU BELONG TO ANY CLUBS OR ORGANIZATIONS SUCH AS CHURCH GROUPS UNIONS, FRATERNAL OR ATHLETIC GROUPS, OR SCHOOL GROUPS?: YES
IN A TYPICAL WEEK, HOW MANY TIMES DO YOU TALK ON THE PHONE WITH FAMILY, FRIENDS, OR NEIGHBORS?: THREE TIMES A WEEK
HOW OFTEN DO YOU GET TOGETHER WITH FRIENDS OR RELATIVES?: THREE TIMES A WEEK
HOW HARD IS IT FOR YOU TO PAY FOR THE VERY BASICS LIKE FOOD, HOUSING, MEDICAL CARE, AND HEATING?: NOT HARD AT ALL
HOW MANY DRINKS CONTAINING ALCOHOL DO YOU HAVE ON A TYPICAL DAY WHEN YOU ARE DRINKING: PATIENT DECLINED
HOW OFTEN DO YOU HAVE SIX OR MORE DRINKS ON ONE OCCASION: NEVER
IN A TYPICAL WEEK, HOW MANY TIMES DO YOU TALK ON THE PHONE WITH FAMILY, FRIENDS, OR NEIGHBORS?: THREE TIMES A WEEK
DO YOU BELONG TO ANY CLUBS OR ORGANIZATIONS SUCH AS CHURCH GROUPS UNIONS, FRATERNAL OR ATHLETIC GROUPS, OR SCHOOL GROUPS?: YES
HOW OFTEN DO YOU GET TOGETHER WITH FRIENDS OR RELATIVES?: THREE TIMES A WEEK

## 2022-03-29 ASSESSMENT — LIFESTYLE VARIABLES
HOW OFTEN DO YOU HAVE A DRINK CONTAINING ALCOHOL: NEVER
HOW OFTEN DO YOU HAVE SIX OR MORE DRINKS ON ONE OCCASION: NEVER
HOW MANY STANDARD DRINKS CONTAINING ALCOHOL DO YOU HAVE ON A TYPICAL DAY: PATIENT DECLINED

## 2022-03-30 ENCOUNTER — HOSPITAL ENCOUNTER (OUTPATIENT)
Dept: LAB | Facility: MEDICAL CENTER | Age: 42
End: 2022-03-30
Attending: FAMILY MEDICINE
Payer: COMMERCIAL

## 2022-03-30 ENCOUNTER — OFFICE VISIT (OUTPATIENT)
Dept: MEDICAL GROUP | Facility: PHYSICIAN GROUP | Age: 42
End: 2022-03-30
Payer: COMMERCIAL

## 2022-03-30 VITALS
DIASTOLIC BLOOD PRESSURE: 68 MMHG | HEART RATE: 61 BPM | SYSTOLIC BLOOD PRESSURE: 100 MMHG | WEIGHT: 125 LBS | TEMPERATURE: 98.1 F | OXYGEN SATURATION: 99 % | BODY MASS INDEX: 20.83 KG/M2 | HEIGHT: 65 IN

## 2022-03-30 DIAGNOSIS — Z00.00 WELLNESS EXAMINATION: ICD-10-CM

## 2022-03-30 DIAGNOSIS — Z00.00 WELLNESS EXAMINATION: Primary | ICD-10-CM

## 2022-03-30 DIAGNOSIS — R19.5 CHANGE IN STOOL: ICD-10-CM

## 2022-03-30 LAB
ANION GAP SERPL CALC-SCNC: 13 MMOL/L (ref 7–16)
BUN SERPL-MCNC: 12 MG/DL (ref 8–22)
CALCIUM SERPL-MCNC: 9.8 MG/DL (ref 8.5–10.5)
CHLORIDE SERPL-SCNC: 99 MMOL/L (ref 96–112)
CO2 SERPL-SCNC: 27 MMOL/L (ref 20–33)
CREAT SERPL-MCNC: 0.83 MG/DL (ref 0.5–1.4)
GFR SERPLBLD CREATININE-BSD FMLA CKD-EPI: 89 ML/MIN/1.73 M 2
GLUCOSE SERPL-MCNC: 84 MG/DL (ref 65–99)
POTASSIUM SERPL-SCNC: 4.6 MMOL/L (ref 3.6–5.5)
SODIUM SERPL-SCNC: 139 MMOL/L (ref 135–145)

## 2022-03-30 PROCEDURE — 99396 PREV VISIT EST AGE 40-64: CPT | Performed by: FAMILY MEDICINE

## 2022-03-30 PROCEDURE — 80048 BASIC METABOLIC PNL TOTAL CA: CPT

## 2022-03-30 PROCEDURE — 36415 COLL VENOUS BLD VENIPUNCTURE: CPT

## 2022-03-30 ASSESSMENT — PATIENT HEALTH QUESTIONNAIRE - PHQ9: CLINICAL INTERPRETATION OF PHQ2 SCORE: 0

## 2022-03-30 NOTE — PROGRESS NOTES
Subjective:     CC:   Chief Complaint   Patient presents with   • Annual Exam       HPI:   Afshan Huitron is a 41 y.o. female who presents for annual exam. She is feeling well and denies any complaints.    Ob-Gyn/ History:    Patient has GYN provider: yes  /Para:  0/0  Last Pap Smear:  . No history of abnormal pap smears.  Gyn Surgery:  Laparoscopy for endometriosis.  Current Contraceptive Method:  N/a. Not currently sexually active.  Last menstrual period:  8 months ago.  Periods irregular. spotting bleeding. Cramping is none.   She does not take OTC analgesics for cramps.  No significant bloating/fluid retention, pelvic pain, or dyspareunia. No vaginal discharge  Post-menopausal bleeding: n/a  Urinary incontinence: n/a  Folate intake: no     Health Maintenance  Advanced directive: n/a   Osteoporosis Screen/ DEXA: n/a   PT/vit D for falls prevention: n/a   Cholesterol Screenin2021 - T chol 204, TG 55, HDL 96, LDL 97; ASCVD 1.4%   Diabetes Screening: n/a - BMI <25   Aspirin Use: n/a    Diet: healthy   Exercise: regular - on feet a lot   Substance Abuse:    Safe in relationship.   Seat belts, bike helmet, gun safety discussed.  Sun protection used.    Cancer screening  Colorectal Cancer Screening: n/a    Lung Cancer Screening: n/a - never smoker    Cervical Cancer Screening: due    Breast Cancer Screening: due 2022     Infectious disease screening/Immunizations  --STI Screening: declined   --Practices safe sex.  --HIV Screening: declined   --Hepatitis C Screening: n/a   --Immunizations:    Influenza: completed    HPV:  n/a    Tetanus: due     Shingles: n/a    Pneumococcal : n/a     COVID-19: completed (3/3 doses)  Other immunizations: n/a     She  has a past medical history of Endometriosis.  She  has a past surgical history that includes laparoscopy (); external fixator application (Left, 2017); and hardware removal ortho (Left, 2020).    Family History   Problem  Relation Age of Onset   • Cancer Father         melanoma   • Autoimmune Disease Maternal Aunt         RA   • Stroke Maternal Aunt         hemorrhagic   • Heart Disease Other    • Autoimmune Disease Maternal Aunt         RA   • Diabetes Neg Hx        Social History     Socioeconomic History   • Marital status: Single     Spouse name: Not on file   • Number of children: Not on file   • Years of education: Not on file   • Highest education level: Doctorate   Occupational History   • Not on file   Tobacco Use   • Smoking status: Never Smoker   • Smokeless tobacco: Never Used   Vaping Use   • Vaping Use: Never used   Substance and Sexual Activity   • Alcohol use: No     Alcohol/week: 0.0 oz   • Drug use: Never   • Sexual activity: Not Currently     Partners: Male   Other Topics Concern   • Not on file   Social History Narrative   • Not on file     Social Determinants of Health     Financial Resource Strain: Low Risk    • Difficulty of Paying Living Expenses: Not hard at all   Food Insecurity: No Food Insecurity   • Worried About Running Out of Food in the Last Year: Never true   • Ran Out of Food in the Last Year: Never true   Transportation Needs: No Transportation Needs   • Lack of Transportation (Medical): No   • Lack of Transportation (Non-Medical): No   Physical Activity: Sufficiently Active   • Days of Exercise per Week: 4 days   • Minutes of Exercise per Session: 50 min   Stress: No Stress Concern Present   • Feeling of Stress : Only a little   Social Connections: Moderately Integrated   • Frequency of Communication with Friends and Family: Three times a week   • Frequency of Social Gatherings with Friends and Family: Three times a week   • Attends Alevism Services: More than 4 times per year   • Active Member of Clubs or Organizations: Yes   • Attends Club or Organization Meetings: More than 4 times per year   • Marital Status: Never    Intimate Partner Violence: Not on file   Housing Stability: Low Risk  "   • Unable to Pay for Housing in the Last Year: No   • Number of Places Lived in the Last Year: 1   • Unstable Housing in the Last Year: No       Patient Active Problem List    Diagnosis Date Noted   • Perimenopausal 09/24/2021   • Jugular venous distension 09/24/2021   • Macrocytosis 06/13/2017   • Hyponatremia 06/13/2017   • Irritable bowel syndrome with constipation 10/26/2016         No current outpatient medications on file.     No current facility-administered medications for this visit.     No Known Allergies    Review of Systems   Constitutional: Negative for fever, chills.   HENT: Negative for congestion.    Eyes: Negative for pain.    Respiratory: Negative for shortness of breath.  Cardiovascular: Negative for leg swelling.   Gastrointestinal: Positive for diarrhea, hematochezia sometimes.  Genitourinary: Negative for hematuria.   Skin: Negative for rash.   Neurological: Negative for dizziness.   Endo/Heme/Allergies: Does not bleed easily.   Psychiatric/Behavioral: Negative for depression.  The patient is not nervous/anxious.      Objective:     /68 (BP Location: Left arm, Patient Position: Sitting, BP Cuff Size: Adult)   Pulse 61   Temp 36.7 °C (98.1 °F) (Temporal)   Ht 1.651 m (5' 5\")   Wt 56.7 kg (125 lb)   SpO2 99%   BMI 20.80 kg/m²   Body mass index is 20.8 kg/m².  Wt Readings from Last 4 Encounters:   03/30/22 56.7 kg (125 lb)   12/23/21 58.1 kg (128 lb)   09/24/21 56.9 kg (125 lb 6.4 oz)   09/06/21 56.4 kg (124 lb 6.4 oz)       Physical Exam:  Constitutional: Well-developed and well-nourished. Not diaphoretic. No distress.   Skin: Skin is warm and dry. No rash noted.  Head: Atraumatic without lesions.  Eyes: Conjunctivae and extraocular motions are normal. Pupils are equal, round, and reactive to light. No scleral icterus.   Ears:  External ears unremarkable. Tympanic membranes clear and intact.  Mouth/Throat: Dentition is good. Tongue normal. Oropharynx is clear and moist. Posterior " pharynx without erythema or exudates.  Neck: Supple, trachea midline. Normal range of motion. No thyromegaly present. No lymphadenopathy--cervical or supraclavicular.  Cardiovascular: Regular rate and rhythm, S1 and S2 without murmur, rubs, or gallops.  Lungs: Normal inspiratory effort, CTA bilaterally, no wheezes/rhonchi/rales  Abdomen: Soft, non tender, and without distention. Active bowel sounds in all four quadrants. No rebound, guarding, masses or HSM.  Extremities: No cyanosis, clubbing, erythema, nor edema. Distal pulses intact and symmetric.   Musculoskeletal: All major joints AROM full in all directions without pain.  Neurological: Alert and oriented x 3. DTRs 2+/3 and symmetric. No cranial nerve deficit. 5/5 myotomes. Sensation intact.   Psychiatric:  Behavior, mood, and affect are appropriate.    Assessment and Plan:     1. Wellness examination  Basic Metabolic Panel   2. Change in stool  Upon ROS, found to have occasional hematochezia and change in stool caliber. May need colonoscopy.  Referral to Gastroenterology     HCM:  Up to date   Labs per orders  Immunizations per orders  Patient counseled about skin care, diet, supplements, prenatal vitamins, safe sex and exercise.    Referral for genetic research was offered. Patient declined.      Follow-up: Return in about 1 year (around 3/30/2023) for Annual/wellness visit.

## 2022-05-06 PROCEDURE — RXMED WILLOW AMBULATORY MEDICATION CHARGE: Performed by: INTERNAL MEDICINE

## 2022-05-07 ENCOUNTER — HOSPITAL ENCOUNTER (OUTPATIENT)
Dept: LAB | Facility: MEDICAL CENTER | Age: 42
End: 2022-05-07
Attending: INTERNAL MEDICINE
Payer: COMMERCIAL

## 2022-05-07 LAB
ALBUMIN SERPL BCP-MCNC: 4.4 G/DL (ref 3.2–4.9)
ALBUMIN/GLOB SERPL: 1.4 G/DL
ALP SERPL-CCNC: 115 U/L (ref 30–99)
ALT SERPL-CCNC: 59 U/L (ref 2–50)
ANION GAP SERPL CALC-SCNC: 12 MMOL/L (ref 7–16)
AST SERPL-CCNC: 43 U/L (ref 12–45)
BASOPHILS # BLD AUTO: 1.1 % (ref 0–1.8)
BASOPHILS # BLD: 0.06 K/UL (ref 0–0.12)
BILIRUB SERPL-MCNC: 0.4 MG/DL (ref 0.1–1.5)
BUN SERPL-MCNC: 11 MG/DL (ref 8–22)
CALCIUM SERPL-MCNC: 9.4 MG/DL (ref 8.5–10.5)
CHLORIDE SERPL-SCNC: 100 MMOL/L (ref 96–112)
CO2 SERPL-SCNC: 25 MMOL/L (ref 20–33)
CREAT SERPL-MCNC: 0.92 MG/DL (ref 0.5–1.4)
CRP SERPL HS-MCNC: <0.3 MG/DL (ref 0–0.75)
EOSINOPHIL # BLD AUTO: 0.17 K/UL (ref 0–0.51)
EOSINOPHIL NFR BLD: 3.1 % (ref 0–6.9)
ERYTHROCYTE [DISTWIDTH] IN BLOOD BY AUTOMATED COUNT: 44.8 FL (ref 35.9–50)
FASTING STATUS PATIENT QL REPORTED: NORMAL
GFR SERPLBLD CREATININE-BSD FMLA CKD-EPI: 79 ML/MIN/1.73 M 2
GGT SERPL-CCNC: 81 U/L (ref 7–34)
GLOBULIN SER CALC-MCNC: 3.1 G/DL (ref 1.9–3.5)
GLUCOSE SERPL-MCNC: 97 MG/DL (ref 65–99)
HBV CORE AB SERPL QL IA: NONREACTIVE
HBV SURFACE AB SERPL IA-ACNC: 112 MIU/ML (ref 0–10)
HBV SURFACE AG SER QL: NORMAL
HCT VFR BLD AUTO: 41.5 % (ref 37–47)
HCV AB SER QL: NORMAL
HGB BLD-MCNC: 13.9 G/DL (ref 12–16)
IMM GRANULOCYTES # BLD AUTO: 0.01 K/UL (ref 0–0.11)
IMM GRANULOCYTES NFR BLD AUTO: 0.2 % (ref 0–0.9)
INR PPP: 0.96 (ref 0.87–1.13)
LYMPHOCYTES # BLD AUTO: 1.87 K/UL (ref 1–4.8)
LYMPHOCYTES NFR BLD: 33.8 % (ref 22–41)
MCH RBC QN AUTO: 33.4 PG (ref 27–33)
MCHC RBC AUTO-ENTMCNC: 33.5 G/DL (ref 33.6–35)
MCV RBC AUTO: 99.8 FL (ref 81.4–97.8)
MONOCYTES # BLD AUTO: 0.33 K/UL (ref 0–0.85)
MONOCYTES NFR BLD AUTO: 6 % (ref 0–13.4)
NEUTROPHILS # BLD AUTO: 3.09 K/UL (ref 2–7.15)
NEUTROPHILS NFR BLD: 55.8 % (ref 44–72)
NRBC # BLD AUTO: 0 K/UL
NRBC BLD-RTO: 0 /100 WBC
PLATELET # BLD AUTO: 325 K/UL (ref 164–446)
PMV BLD AUTO: 9.7 FL (ref 9–12.9)
POTASSIUM SERPL-SCNC: 4.2 MMOL/L (ref 3.6–5.5)
PROT SERPL-MCNC: 7.5 G/DL (ref 6–8.2)
PROTHROMBIN TIME: 12.5 SEC (ref 12–14.6)
RBC # BLD AUTO: 4.16 M/UL (ref 4.2–5.4)
SODIUM SERPL-SCNC: 137 MMOL/L (ref 135–145)
WBC # BLD AUTO: 5.5 K/UL (ref 4.8–10.8)

## 2022-05-07 PROCEDURE — 36415 COLL VENOUS BLD VENIPUNCTURE: CPT

## 2022-05-07 PROCEDURE — 86708 HEPATITIS A ANTIBODY: CPT

## 2022-05-07 PROCEDURE — 85610 PROTHROMBIN TIME: CPT

## 2022-05-07 PROCEDURE — 87340 HEPATITIS B SURFACE AG IA: CPT

## 2022-05-07 PROCEDURE — 82784 ASSAY IGA/IGD/IGG/IGM EACH: CPT

## 2022-05-07 PROCEDURE — 86803 HEPATITIS C AB TEST: CPT

## 2022-05-07 PROCEDURE — 82977 ASSAY OF GGT: CPT

## 2022-05-07 PROCEDURE — 80053 COMPREHEN METABOLIC PANEL: CPT

## 2022-05-07 PROCEDURE — 82107 ALPHA-FETOPROTEIN L3: CPT

## 2022-05-07 PROCEDURE — 86706 HEP B SURFACE ANTIBODY: CPT

## 2022-05-07 PROCEDURE — 83516 IMMUNOASSAY NONANTIBODY: CPT

## 2022-05-07 PROCEDURE — 85025 COMPLETE CBC W/AUTO DIFF WBC: CPT

## 2022-05-07 PROCEDURE — 86140 C-REACTIVE PROTEIN: CPT

## 2022-05-07 PROCEDURE — 86704 HEP B CORE ANTIBODY TOTAL: CPT

## 2022-05-09 ENCOUNTER — PHARMACY VISIT (OUTPATIENT)
Dept: PHARMACY | Facility: MEDICAL CENTER | Age: 42
End: 2022-05-09
Payer: COMMERCIAL

## 2022-05-09 LAB
HAV AB SER QL IA: NEGATIVE
IGA SERPL-MCNC: 175 MG/DL (ref 68–408)

## 2022-05-11 ENCOUNTER — HOSPITAL ENCOUNTER (OUTPATIENT)
Dept: RADIOLOGY | Facility: MEDICAL CENTER | Age: 42
End: 2022-05-11
Attending: FAMILY MEDICINE
Payer: COMMERCIAL

## 2022-05-11 DIAGNOSIS — R92.8 ABNORMAL FINDING ON BREAST IMAGING: ICD-10-CM

## 2022-05-11 LAB — GLIADIN PEPTIDE+TTG IGA+IGG SER QL IA: 13 UNITS (ref 0–19)

## 2022-05-11 PROCEDURE — G0279 TOMOSYNTHESIS, MAMMO: HCPCS

## 2022-05-12 LAB
AFP L3 MFR SERPL: <0.5 % (ref 0–9.9)
AFP SERPL-MCNC: 2 NG/ML (ref 0–15)

## 2022-05-20 ENCOUNTER — TELEMEDICINE (OUTPATIENT)
Dept: MEDICAL GROUP | Facility: PHYSICIAN GROUP | Age: 42
End: 2022-05-20
Payer: COMMERCIAL

## 2022-05-20 ENCOUNTER — HOSPITAL ENCOUNTER (OUTPATIENT)
Dept: LAB | Facility: MEDICAL CENTER | Age: 42
End: 2022-05-20
Attending: FAMILY MEDICINE
Payer: COMMERCIAL

## 2022-05-20 VITALS — WEIGHT: 125 LBS | BODY MASS INDEX: 20.83 KG/M2 | HEIGHT: 65 IN

## 2022-05-20 DIAGNOSIS — Z13.79 GENETIC SCREENING: ICD-10-CM

## 2022-05-20 DIAGNOSIS — R94.4 DECREASED GFR: Primary | ICD-10-CM

## 2022-05-20 DIAGNOSIS — D75.89 MACROCYTOSIS: ICD-10-CM

## 2022-05-20 LAB
HGB RETIC QN AUTO: 37.3 PG/CELL (ref 29–35)
IMM RETICS NFR: 10.9 % (ref 9.3–17.4)
RETICS # AUTO: 0.07 M/UL (ref 0.04–0.06)
RETICS/RBC NFR: 1.7 % (ref 0.8–2.1)

## 2022-05-20 PROCEDURE — 36415 COLL VENOUS BLD VENIPUNCTURE: CPT

## 2022-05-20 PROCEDURE — 82607 VITAMIN B-12: CPT

## 2022-05-20 PROCEDURE — 99213 OFFICE O/P EST LOW 20 MIN: CPT | Mod: 95 | Performed by: FAMILY MEDICINE

## 2022-05-20 PROCEDURE — 85046 RETICYTE/HGB CONCENTRATE: CPT

## 2022-05-20 ASSESSMENT — FIBROSIS 4 INDEX: FIB4 SCORE: 0.74

## 2022-05-20 NOTE — PROGRESS NOTES
"Virtual Visit: Established Patient   This visit was conducted via Zoom using secure and encrypted videoconferencing technology.   The patient was in their home in the state of [place].    The patient's identity was confirmed and verbal consent was obtained for this virtual visit.    Subjective:   CC:   Chief Complaint   Patient presents with   • Lab Results     Afshan Huitron is a 41 y.o. female presenting for evaluation and management of:    Problem   Macrocytosis    Chronic.  Onset: at least 2005    EtOH: rare  B12: elevated (2017)  Folate: normal (2017)  TSH: normal (2021)  MM: no back pain, no hypercalcemia, +renal insufficiency         ROS   Gen: no fevers/chills  CV: no chest pain  Pulm: no shortness of breath    Current medicines (including changes today)  Current Outpatient Medications   Medication Sig Dispense Refill   • polyethylene glycol-electrolytes (GOLYTELY) 236 GM Recon Soln Follow [facility] instruction handout. 4000 mL 0     No current facility-administered medications for this visit.       Patient Active Problem List    Diagnosis Date Noted   • Perimenopausal 09/24/2021   • Jugular venous distension 09/24/2021   • Macrocytosis 06/13/2017   • Hyponatremia 06/13/2017   • Irritable bowel syndrome with constipation 10/26/2016        Objective:   Ht 1.651 m (5' 5\")   Wt 56.7 kg (125 lb) Comment: pt stated  BMI 20.80 kg/m²  RR: 12    Physical Exam:  Constitutional: Alert, no distress, well-groomed.  Skin: No rashes in visible areas.  Eye: Round. Conjunctiva clear, lids normal. No icterus.   ENMT: Lips pink without lesions, good dentition, moist mucous membranes. Phonation normal.  Neck: No masses, no thyromegaly. Moves freely without pain.  Respiratory: Unlabored respiratory effort, no cough or audible wheeze  Psych: Alert and oriented x3, normal affect and mood.     Assessment and Plan:   The following treatment plan was discussed:     1. Decreased GFR  Acute.  Labs a month ago showed a GFR " that was mildly decreased at 89.  Labs 2 weeks ago show 79.  Hard to say if this is within the standard deviation of the calculation but we will send her to nephrology for further evaluation.  - Referral to Nephrology    2. Macrocytosis  Chronic, stable.  Since at least 2005 she has had macrocytosis.  So far no etiology has been determined.  However, there is this recent potential renal insufficiency so if her B12 and reticulocyte count are within normal limits we may want to consider SPEP and UPEP.  - VITAMIN B12; Future  - RETICULOCYTES COUNT; Future    3. Genetic screening  - Referral to Genetic Research Studies      Follow-up: Return if symptoms worsen or fail to improve.

## 2022-05-21 LAB — VIT B12 SERPL-MCNC: 2722 PG/ML (ref 211–911)

## 2022-05-25 ENCOUNTER — HOSPITAL ENCOUNTER (OUTPATIENT)
Dept: LAB | Facility: MEDICAL CENTER | Age: 42
End: 2022-05-25
Attending: INTERNAL MEDICINE
Payer: COMMERCIAL

## 2022-05-25 ENCOUNTER — HOSPITAL ENCOUNTER (OUTPATIENT)
Dept: RADIOLOGY | Facility: MEDICAL CENTER | Age: 42
End: 2022-05-25
Attending: INTERNAL MEDICINE
Payer: COMMERCIAL

## 2022-05-25 DIAGNOSIS — R10.33 ABDOMINAL PAIN, PERIUMBILICAL: ICD-10-CM

## 2022-05-25 DIAGNOSIS — R10.13 EPIGASTRIC PAIN: ICD-10-CM

## 2022-05-25 DIAGNOSIS — K92.1 HEMATOCHEZIA: ICD-10-CM

## 2022-05-25 DIAGNOSIS — Z11.59 SPECIAL SCREENING EXAMINATION FOR VIRAL DISEASE: ICD-10-CM

## 2022-05-25 DIAGNOSIS — R10.11 RIGHT UPPER QUADRANT PAIN: ICD-10-CM

## 2022-05-25 LAB — AMMONIA PLAS-SCNC: 19 UMOL/L (ref 11–45)

## 2022-05-25 PROCEDURE — 82103 ALPHA-1-ANTITRYPSIN TOTAL: CPT

## 2022-05-25 PROCEDURE — 86381 MITOCHONDRIAL ANTIBODY EACH: CPT

## 2022-05-25 PROCEDURE — 82390 ASSAY OF CERULOPLASMIN: CPT

## 2022-05-25 PROCEDURE — 82172 ASSAY OF APOLIPOPROTEIN: CPT

## 2022-05-25 PROCEDURE — 86235 NUCLEAR ANTIGEN ANTIBODY: CPT | Mod: 91

## 2022-05-25 PROCEDURE — 83010 ASSAY OF HAPTOGLOBIN QUANT: CPT

## 2022-05-25 PROCEDURE — 83883 ASSAY NEPHELOMETRY NOT SPEC: CPT

## 2022-05-25 PROCEDURE — 82784 ASSAY IGA/IGD/IGG/IGM EACH: CPT

## 2022-05-25 PROCEDURE — 84478 ASSAY OF TRIGLYCERIDES: CPT

## 2022-05-25 PROCEDURE — 82947 ASSAY GLUCOSE BLOOD QUANT: CPT

## 2022-05-25 PROCEDURE — 86038 ANTINUCLEAR ANTIBODIES: CPT

## 2022-05-25 PROCEDURE — 82140 ASSAY OF AMMONIA: CPT

## 2022-05-25 PROCEDURE — 84460 ALANINE AMINO (ALT) (SGPT): CPT

## 2022-05-25 PROCEDURE — 82247 BILIRUBIN TOTAL: CPT

## 2022-05-25 PROCEDURE — 82977 ASSAY OF GGT: CPT

## 2022-05-25 PROCEDURE — 86376 MICROSOMAL ANTIBODY EACH: CPT

## 2022-05-25 PROCEDURE — 86015 ACTIN ANTIBODY EACH: CPT

## 2022-05-25 PROCEDURE — 36415 COLL VENOUS BLD VENIPUNCTURE: CPT

## 2022-05-25 PROCEDURE — 86039 ANTINUCLEAR ANTIBODIES (ANA): CPT

## 2022-05-25 PROCEDURE — 86225 DNA ANTIBODY NATIVE: CPT

## 2022-05-25 PROCEDURE — 82465 ASSAY BLD/SERUM CHOLESTEROL: CPT

## 2022-05-25 PROCEDURE — 80061 LIPID PANEL: CPT

## 2022-05-25 PROCEDURE — 84450 TRANSFERASE (AST) (SGOT): CPT

## 2022-05-25 PROCEDURE — 76700 US EXAM ABDOM COMPLETE: CPT

## 2022-05-26 LAB
CHOLEST SERPL-MCNC: 222 MG/DL (ref 100–199)
HDLC SERPL-MCNC: 88 MG/DL
LDLC SERPL CALC-MCNC: 120 MG/DL
TRIGL SERPL-MCNC: 72 MG/DL (ref 0–149)

## 2022-05-27 LAB
A1AT SERPL-MCNC: 114 MG/DL (ref 90–200)
CERULOPLASMIN SERPL-MCNC: 18 MG/DL (ref 16–45)
IGG SERPL-MCNC: 1026 MG/DL (ref 768–1632)
NUCLEAR IGG SER QL IA: DETECTED

## 2022-05-28 LAB
ANA INTERPRETIVE COMMENT Q5143: ABNORMAL
ANA PATTERN Q5144: ABNORMAL
ANA TITER Q5145: ABNORMAL
ANTINUCLEAR ANTIBODY (ANA), HEP-2, IGG Q5142: DETECTED
LKM AB TITR SER IF: NORMAL {TITER}
MITOCHONDRIA M2 IGG SER-ACNC: 2.6 UNITS (ref 0–24.9)
SMA IGG SER-ACNC: 8 UNITS (ref 0–19)

## 2022-05-29 LAB
DSDNA AB TITR SER CLIF: 2 IU (ref 0–24)
ENA JO1 AB TITR SER: 0 AU/ML (ref 0–40)
ENA SCL70 IGG SER QL: 0 AU/ML (ref 0–40)
ENA SM IGG SER-ACNC: 0 AU/ML (ref 0–40)
ENA SS-B IGG SER IA-ACNC: 0 AU/ML (ref 0–40)
SSA52 R0ENA AB IGG Q0420: 2 AU/ML (ref 0–40)
SSA60 R0ENA AB IGG Q0419: 0 AU/ML (ref 0–40)

## 2022-05-30 LAB
A2 MACROGLOB SERPL-MCNC: 205 MG/DL (ref 110–276)
ALT SERPL W P-5'-P-CCNC: 33 IU/L (ref 0–40)
APO A-I SERPL-MCNC: 189 MG/DL (ref 116–209)
AST SERPL W P-5'-P-CCNC: 29 IU/L (ref 0–40)
BILIRUB SERPL-MCNC: 0.4 MG/DL (ref 0–1.2)
CHOLEST SERPL-MCNC: 216 MG/DL (ref 100–199)
FIBROSIS SCORING 550107: ABNORMAL
FIBROSIS STAGE SERPL QL: ABNORMAL
GGT SERPL-CCNC: 40 IU/L (ref 0–60)
GLUCOSE SERPL-MCNC: 100 MG/DL (ref 65–99)
HAPTOGLOB SERPL-MCNC: 75 MG/DL (ref 42–296)
INTERPRETATIONS: Z4787: ABNORMAL
LABORATORY COMMENT REPORT: ABNORMAL
LIVER FIBR SCORE SERPL CALC.FIBROSURE: 0.13 (ref 0–0.21)
NASH SCORING Z4789: ABNORMAL
NECROINFLAMMATORY ACT GRADE SERPL QL: ABNORMAL
NECROINFLAMMATORY ACT SCORE SERPL: 0.25
SERVICE CMNT-IMP: ABNORMAL
STEATOSIS GRADE Z4778: ABNORMAL
STEATOSIS GRADING Z4788: ABNORMAL
STEATOSIS SCORE Z4777: 0.19 (ref 0–0.3)
TRIGL SERPL-MCNC: 82 MG/DL (ref 0–149)

## 2022-05-31 LAB — U1 SNRNP IGG SER QL: 2 UNITS (ref 0–19)

## 2022-06-30 ENCOUNTER — OFFICE VISIT (OUTPATIENT)
Dept: NEPHROLOGY | Facility: MEDICAL CENTER | Age: 42
End: 2022-06-30
Payer: COMMERCIAL

## 2022-06-30 VITALS
HEART RATE: 58 BPM | BODY MASS INDEX: 21.34 KG/M2 | WEIGHT: 125 LBS | OXYGEN SATURATION: 99 % | HEIGHT: 64 IN | SYSTOLIC BLOOD PRESSURE: 98 MMHG | TEMPERATURE: 98 F | DIASTOLIC BLOOD PRESSURE: 66 MMHG

## 2022-06-30 DIAGNOSIS — R94.4 DECREASED GFR: ICD-10-CM

## 2022-06-30 PROBLEM — E87.1 HYPONATREMIA: Status: RESOLVED | Noted: 2017-06-13 | Resolved: 2022-06-30

## 2022-06-30 PROCEDURE — 99203 OFFICE O/P NEW LOW 30 MIN: CPT | Performed by: INTERNAL MEDICINE

## 2022-06-30 ASSESSMENT — ENCOUNTER SYMPTOMS
FEVER: 0
ABDOMINAL PAIN: 1
SHORTNESS OF BREATH: 0

## 2022-06-30 ASSESSMENT — FIBROSIS 4 INDEX: FIB4 SCORE: 0.67

## 2022-06-30 NOTE — PROGRESS NOTES
Chief Complaint   Patient presents with   • New Patient     Decreased GFR       Requesting Provider: Joanna Vargas M.D.    HPI:  Afshan Huitron is a 41 y.o. female with dysmenorrhea/endometriosis who presents today to establish nephrology care.     Re: CKD, she denies history of JEF. Denies kidney stones. Denies pregnancy. 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 She exercises doing pilates or johanna or yoga 3-4 times a week, and resistance training once a week. 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 She has dysmenorrhea and used to take naproxen 440mg BID for a few days every month. She does admit she doesn't hydrate well, urine is deep yellow at times. She denies history of HTN, DM2.     Re: hypotension, says her BP runs low 100's / 70's. She denies light-headedness most of the time.       Past Medical History:   Diagnosis Date   • Endometriosis        Past Surgical History:   Procedure Laterality Date   • HARDWARE REMOVAL ORTHO Left 02/2020    tib-fib   • EXTERNAL FIXATOR APPLICATION Left 11/11/2017    Procedure: EXTERNAL FIXATOR APPLICATION;  Surgeon: Ino Smith M.D.;  [facility]: SURGERY [facility];  Service: Orthopedics   • LAPAROSCOPY  2004    endometriosis        Outpatient Encounter Medications as of 6/30/2022   Medication Sig Dispense Refill   • Zinc 50 MG Cap Take 50 mg by mouth every day.     • [DISCONTINUED] polyethylene glycol-electrolytes (GOLYTELY) 236 GM Recon Soln Follow [facility] instruction handout. (Patient not taking: Reported on 6/30/2022) 4000 mL 0     No facility-administered encounter medications on file as of 6/30/2022.        No Known Allergies    Social History     Socioeconomic History   • Marital status: Single     Spouse name: Not on file   • Number of children: Not on file   • Years of education: Not on file   • Highest education level: Doctorate   Occupational History   • Not on file   Tobacco Use   • Smoking status:  Never Smoker   • Smokeless tobacco: Never Used   Vaping Use   • Vaping Use: Never used   Substance and Sexual Activity   • Alcohol use: No     Alcohol/week: 0.0 oz   • Drug use: Never   • Sexual activity: Not Currently     Partners: Male   Other Topics Concern   • Not on file   Social History Narrative   • Not on file     Social Determinants of Health     Financial Resource Strain: Low Risk    • Difficulty of Paying Living Expenses: Not hard at all   Food Insecurity: No Food Insecurity   • Worried About Running Out of Food in the Last Year: Never true   • Ran Out of Food in the Last Year: Never true   Transportation Needs: No Transportation Needs   • Lack of Transportation (Medical): No   • Lack of Transportation (Non-Medical): No   Physical Activity: Sufficiently Active   • Days of Exercise per Week: 4 days   • Minutes of Exercise per Session: 50 min   Stress: No Stress Concern Present   • Feeling of Stress : Only a little   Social Connections: Moderately Integrated   • Frequency of Communication with Friends and Family: Three times a week   • Frequency of Social Gatherings with Friends and Family: Three times a week   • Attends Confucianist Services: More than 4 times per year   • Active Member of Clubs or Organizations: Yes   • Attends Club or Organization Meetings: More than 4 times per year   • Marital Status: Never    Intimate Partner Violence: Not on file   Housing Stability: Low Risk    • Unable to Pay for Housing in the Last Year: No   • Number of Places Lived in the Last Year: 1   • Unstable Housing in the Last Year: No       Family History   Problem Relation Age of Onset   • Cancer Father         melanoma   • Autoimmune Disease Maternal Aunt         RA   • Stroke Maternal Aunt         hemorrhagic   • Autoimmune Disease Maternal Aunt         RA   • Kidney Disease Paternal Aunt         CKD maybe from NSAIDs   • Heart Disease Other    • Diabetes Neg Hx    • Ovarian Cancer Neg Hx    • Tubal Cancer Neg Hx   "  • Peritoneal Cancer Neg Hx    • Colorectal Cancer Neg Hx    • Breast Cancer Neg Hx        Review of Systems   Constitutional: Negative for fever.   Respiratory: Negative for shortness of breath.    Cardiovascular: Negative for chest pain.   Gastrointestinal: Positive for abdominal pain (on and off).   Genitourinary: Negative for dysuria and hematuria.   All other systems reviewed and are negative.      BP (!) 98/66 (BP Location: Right arm, Patient Position: Sitting, BP Cuff Size: Adult)   Pulse (!) 58   Temp 36.7 °C (98 °F) (Temporal)   Ht 1.626 m (5' 4\")   Wt 56.7 kg (125 lb)   SpO2 99%   BMI 21.46 kg/m²     Physical Exam  Constitutional:       General: She is not in acute distress.  HENT:      Mouth/Throat:      Pharynx: No oropharyngeal exudate.   Eyes:      General: No scleral icterus.  Neck:      Trachea: No tracheal deviation.   Cardiovascular:      Rate and Rhythm: Normal rate and regular rhythm.      Heart sounds: Normal heart sounds. No murmur heard.  Pulmonary:      Effort: Pulmonary effort is normal.      Breath sounds: Normal breath sounds. No stridor. No rales.   Abdominal:      General: Bowel sounds are normal.      Palpations: Abdomen is soft.      Tenderness: There is no abdominal tenderness.   Musculoskeletal:         General: Normal range of motion.      Cervical back: Neck supple.      Right lower leg: No edema.      Left lower leg: No edema.   Skin:     General: Skin is warm and dry.      Findings: No rash.   Neurological:      General: No focal deficit present.      Mental Status: She is alert and oriented to person, place, and time.   Psychiatric:         Mood and Affect: Mood and affect normal.         Behavior: Behavior normal.           Labs reviewed.    Recent Labs     03/30/22  1351 05/07/22  1101 05/25/22  1610   ALBUMIN  --  4.4  --    HDL  --   --  88   TRIGLYCERIDE  --   --  72  82   SODIUM 139 137  --    POTASSIUM 4.6 4.2  --    CHLORIDE 99 100  --    CO2 27 25  --    BUN 12 " 11  --    CREATININE 0.83 0.92  --        Lab Results   Component Value Date/Time    WBC 5.5 05/07/2022 11:01 AM    RBC 4.16 (L) 05/07/2022 11:01 AM    HEMOGLOBIN 13.9 05/07/2022 11:01 AM    HEMATOCRIT 41.5 05/07/2022 11:01 AM    MCV 99.8 (H) 05/07/2022 11:01 AM    MCH 33.4 (H) 05/07/2022 11:01 AM    MCHC 33.5 (L) 05/07/2022 11:01 AM    MPV 9.7 05/07/2022 11:01 AM           URINALYSIS:  Lab Results   Component Value Date/Time    SPECGRAVITY 1.012 11/11/2017 1825     UPC  No results found for: TOTPROTUR No results found for: CREATININEU      Imaging reviewed  No orders to display         Assessment:  Afshan Huitron is a 41 y.o. female with dysmenorrhea/endometriosis who presents today to establish nephrology care.     Plan:  1. Decreased GFR  -Given her active lifestyle, exercising several times per week, and working a job on her feet, I suspect her elevated serum creatinine is due to excess muscle activity rather than decreased kidney function.  Recommend 24-hour urine creatinine clearance to get a better sense of kidney function.  Also recommend urinalysis, urine protein quantification.  We will consider further work-up if urine labs are abnormal.    2.  Hypotension  - Asymptomatic.  Patient has an active lifestyle, and is asymptomatic from her low blood pressure.  Continue to monitor, recommend hydrating well.      Return to clinic tentatively in 6 months, but if creatinine clearance and urine labs are normal, will discharge from nephrology clinic.    Sergio Morin MD  Nephrology

## 2022-07-02 ENCOUNTER — HOSPITAL ENCOUNTER (OUTPATIENT)
Dept: LAB | Facility: MEDICAL CENTER | Age: 42
End: 2022-07-02
Attending: INTERNAL MEDICINE
Payer: COMMERCIAL

## 2022-07-02 DIAGNOSIS — R94.4 DECREASED GFR: ICD-10-CM

## 2022-07-02 LAB
ANION GAP SERPL CALC-SCNC: 11 MMOL/L (ref 7–16)
APPEARANCE UR: CLEAR
BILIRUB UR QL STRIP.AUTO: NEGATIVE
BUN SERPL-MCNC: 13 MG/DL (ref 8–22)
CALCIUM SERPL-MCNC: 9.1 MG/DL (ref 8.5–10.5)
CHLORIDE SERPL-SCNC: 98 MMOL/L (ref 96–112)
CO2 SERPL-SCNC: 25 MMOL/L (ref 20–33)
COLLECT DURATION TIME UR: 24 HR
COLOR UR: YELLOW
CREAT CL/1.73 SQ M ?TM UR+SERPL-ARVRAT: 78 ML/MIN (ref 88–128)
CREAT SERPL-MCNC: 0.92 MG/DL (ref 0.5–1.4)
CREAT SERPL-MCNC: 0.95 MG/DL (ref 0.5–1.4)
CREAT UR-MCNC: 15.84 MG/DL
CREAT UR-MCNC: 16.24 MG/DL
CREAT UR-MCNC: 55.35 MG/DL
GFR SERPLBLD CREATININE-BSD FMLA CKD-EPI: 79 ML/MIN/1.73 M 2
GLUCOSE SERPL-MCNC: 82 MG/DL (ref 65–99)
GLUCOSE UR STRIP.AUTO-MCNC: NEGATIVE MG/DL
KETONES UR STRIP.AUTO-MCNC: NEGATIVE MG/DL
LEUKOCYTE ESTERASE UR QL STRIP.AUTO: NEGATIVE
MICRO URNS: NORMAL
MICROALBUMIN UR-MCNC: <1.2 MG/DL
MICROALBUMIN/CREAT UR: NORMAL MG/G (ref 0–30)
NITRITE UR QL STRIP.AUTO: NEGATIVE
PH UR STRIP.AUTO: 6.5 [PH] (ref 5–8)
POTASSIUM SERPL-SCNC: 4.2 MMOL/L (ref 3.6–5.5)
PROT UR QL STRIP: NEGATIVE MG/DL
PROT UR-MCNC: <4 MG/DL (ref 0–15)
PROT/CREAT UR: NORMAL MG/G (ref 10–107)
RBC UR QL AUTO: NEGATIVE
SODIUM SERPL-SCNC: 134 MMOL/L (ref 135–145)
SP GR UR STRIP.AUTO: 1
SPECIMEN VOL UR: ABNORMAL ML
URINE CREATININE EXCRETED 1125: 996 MG/24 HR
UROBILINOGEN UR STRIP.AUTO-MCNC: 0.2 MG/DL

## 2022-07-02 PROCEDURE — 82570 ASSAY OF URINE CREATININE: CPT | Mod: 91

## 2022-07-02 PROCEDURE — 82575 CREATININE CLEARANCE TEST: CPT

## 2022-07-02 PROCEDURE — 80048 BASIC METABOLIC PNL TOTAL CA: CPT

## 2022-07-02 PROCEDURE — 84156 ASSAY OF PROTEIN URINE: CPT

## 2022-07-02 PROCEDURE — 81003 URINALYSIS AUTO W/O SCOPE: CPT

## 2022-07-02 PROCEDURE — 36415 COLL VENOUS BLD VENIPUNCTURE: CPT

## 2022-07-02 PROCEDURE — 82043 UR ALBUMIN QUANTITATIVE: CPT

## 2022-08-18 ENCOUNTER — HOSPITAL ENCOUNTER (OUTPATIENT)
Dept: RADIOLOGY | Facility: MEDICAL CENTER | Age: 42
End: 2022-08-18
Attending: INTERNAL MEDICINE
Payer: COMMERCIAL

## 2022-08-18 DIAGNOSIS — M79.641 RIGHT HAND PAIN: ICD-10-CM

## 2022-08-18 DIAGNOSIS — M79.642 LEFT HAND PAIN: ICD-10-CM

## 2022-08-18 DIAGNOSIS — M25.562 LEFT KNEE PAIN, UNSPECIFIED CHRONICITY: ICD-10-CM

## 2022-08-18 DIAGNOSIS — M54.2 CERVICALGIA: ICD-10-CM

## 2022-08-18 DIAGNOSIS — M25.561 RIGHT KNEE PAIN, UNSPECIFIED CHRONICITY: ICD-10-CM

## 2022-08-18 PROCEDURE — 77077 JOINT SURVEY SINGLE VIEW: CPT

## 2022-08-18 PROCEDURE — 72040 X-RAY EXAM NECK SPINE 2-3 VW: CPT

## 2022-08-18 PROCEDURE — 73565 X-RAY EXAM OF KNEES: CPT

## 2022-08-23 ENCOUNTER — PHARMACY VISIT (OUTPATIENT)
Dept: PHARMACY | Facility: MEDICAL CENTER | Age: 42
End: 2022-08-23

## 2022-08-23 PROCEDURE — RXOTC WILLOW AMBULATORY OTC CHARGE

## 2022-08-24 ENCOUNTER — PHARMACY VISIT (OUTPATIENT)
Dept: PHARMACY | Facility: MEDICAL CENTER | Age: 42
End: 2022-08-24
Payer: COMMERCIAL

## 2022-08-24 PROCEDURE — RXOTC WILLOW AMBULATORY OTC CHARGE

## 2022-08-24 PROCEDURE — RXMED WILLOW AMBULATORY MEDICATION CHARGE

## 2022-08-24 RX ORDER — PREDNISONE 20 MG/1
TABLET ORAL
Qty: 6 TABLET | Refills: 0 | Status: SHIPPED | OUTPATIENT
Start: 2022-08-24 | End: 2023-03-04

## 2022-09-01 ENCOUNTER — PHARMACY VISIT (OUTPATIENT)
Dept: PHARMACY | Facility: MEDICAL CENTER | Age: 42
End: 2022-09-01

## 2022-09-01 PROCEDURE — RXOTC WILLOW AMBULATORY OTC CHARGE

## 2022-09-06 ENCOUNTER — PHARMACY VISIT (OUTPATIENT)
Dept: PHARMACY | Facility: MEDICAL CENTER | Age: 42
End: 2022-09-06

## 2022-09-06 PROCEDURE — RXOTC WILLOW AMBULATORY OTC CHARGE: Performed by: PHARMACIST

## 2022-09-07 ENCOUNTER — DOCUMENTATION (OUTPATIENT)
Dept: HEALTH INFORMATION MANAGEMENT | Facility: OTHER | Age: 42
End: 2022-09-07
Payer: COMMERCIAL

## 2022-09-12 ENCOUNTER — TELEPHONE (OUTPATIENT)
Dept: HEALTH INFORMATION MANAGEMENT | Facility: OTHER | Age: 42
End: 2022-09-12
Payer: COMMERCIAL

## 2022-09-12 DIAGNOSIS — T78.3XXA ANGIOEDEMA, INITIAL ENCOUNTER: ICD-10-CM

## 2022-09-12 NOTE — TELEPHONE ENCOUNTER
1. Caller Name: Afshan Huitron                         Call Back Number: [phone] (home)         How would the patient prefer to be contacted with a response: MyChart message    2. SPECIFIC Action To Be Taken: Referral pending, please sign.    3. Diagnosis/Clinical Reason for Request: Several bouts of angioedema in the morning and is a requesting a referral for allergy testing with Dr. Margarito George     4. Specialty & Provider Name/Lab/Imaging Location: Dr. Godfrey George    5. Is appointment scheduled for requested order/referral: no    Patient was informed they will receive a return phone call from the office ONLY if there are any questions before processing their request. Advised to call back if they haven't received a call from the referral department in 5 days.

## 2022-09-12 NOTE — TELEPHONE ENCOUNTER
3Phone Number Called: [phone]     Call outcome: Spoke to patient regarding message below.    Message: Spoke to pt and explained PCP message pt had no questions at this time.

## 2023-01-17 ENCOUNTER — PHARMACY VISIT (OUTPATIENT)
Dept: PHARMACY | Facility: MEDICAL CENTER | Age: 43
End: 2023-01-17

## 2023-01-17 PROCEDURE — RXOTC WILLOW AMBULATORY OTC CHARGE

## 2023-02-16 ENCOUNTER — PHARMACY VISIT (OUTPATIENT)
Dept: PHARMACY | Facility: MEDICAL CENTER | Age: 43
End: 2023-02-16

## 2023-02-16 PROCEDURE — RXOTC WILLOW AMBULATORY OTC CHARGE: Performed by: PHARMACIST

## 2023-03-01 ENCOUNTER — PHARMACY VISIT (OUTPATIENT)
Dept: PHARMACY | Facility: MEDICAL CENTER | Age: 43
End: 2023-03-01

## 2023-03-01 PROCEDURE — RXOTC WILLOW AMBULATORY OTC CHARGE

## 2023-03-04 ENCOUNTER — APPOINTMENT (OUTPATIENT)
Dept: RADIOLOGY | Facility: IMAGING CENTER | Age: 43
End: 2023-03-04
Attending: FAMILY MEDICINE

## 2023-03-04 ENCOUNTER — OFFICE VISIT (OUTPATIENT)
Dept: URGENT CARE | Facility: CLINIC | Age: 43
End: 2023-03-04
Payer: COMMERCIAL

## 2023-03-04 VITALS
SYSTOLIC BLOOD PRESSURE: 108 MMHG | RESPIRATION RATE: 12 BRPM | DIASTOLIC BLOOD PRESSURE: 60 MMHG | TEMPERATURE: 97.7 F | WEIGHT: 129.7 LBS | BODY MASS INDEX: 22.14 KG/M2 | HEART RATE: 68 BPM | OXYGEN SATURATION: 100 % | HEIGHT: 64 IN

## 2023-03-04 DIAGNOSIS — S30.0XXA CONTUSION OF COCCYX, INITIAL ENCOUNTER: ICD-10-CM

## 2023-03-04 DIAGNOSIS — S39.92XA INJURY OF BACK, INITIAL ENCOUNTER: ICD-10-CM

## 2023-03-04 PROCEDURE — 72100 X-RAY EXAM L-S SPINE 2/3 VWS: CPT | Mod: TC | Performed by: FAMILY MEDICINE

## 2023-03-04 PROCEDURE — 99213 OFFICE O/P EST LOW 20 MIN: CPT | Performed by: FAMILY MEDICINE

## 2023-03-04 ASSESSMENT — ENCOUNTER SYMPTOMS
NAUSEA: 0
EYE REDNESS: 0
WEIGHT LOSS: 0
ROS SKIN COMMENTS: NO ABRASION OR LACERATION
MYALGIAS: 0
EYE DISCHARGE: 0
VOMITING: 0

## 2023-03-04 ASSESSMENT — FIBROSIS 4 INDEX: FIB4 SCORE: 0.68

## 2023-03-04 NOTE — PROGRESS NOTES
"Subjective     Afshan RIVERO is a 42 y.o. female who presents with Back Pain (Pt has pain on tail bone, lower back pain, hurts to bend x 2 weeks )            2 weeks coccyx injury.  Fell while snowboarding.  She was improving however she fell again recently and pain has migrated to sacrum and lumbar spine.  Worse with bending.  No radiation.  No myelopathy.  No weakness.  No prior injury or surgery.  No other aggravating alleviating factors.      Review of Systems   Constitutional:  Negative for malaise/fatigue and weight loss.   Eyes:  Negative for discharge and redness.   Gastrointestinal:  Negative for nausea and vomiting.   Musculoskeletal:  Negative for joint pain and myalgias.   Skin:         No abrasion or laceration              Objective     /60 (BP Location: Right arm, Patient Position: Sitting, BP Cuff Size: Adult)   Pulse 68   Temp 36.5 °C (97.7 °F) (Temporal)   Resp 12   Ht 1.626 m (5' 4\")   Wt 58.8 kg (129 lb 11.2 oz)   SpO2 100%   BMI 22.26 kg/m²      Physical Exam  Constitutional:       Appearance: Normal appearance.   Musculoskeletal:      Comments: Back: Tender lower lumbar region, sacrum, and coccyx.  No deformity.  Pain reproduced with flexion at 90 degrees.   Skin:     General: Skin is warm and dry.      Findings: No rash.   Neurological:      Mental Status: She is alert.                           Assessment & Plan      X-ray per radiology  No fracture or compression deformity in the lumbar spine.     No definite acute osseous abnormality seen in the sacrum but the evaluation limited due to overlying bowel content.    1. Injury of back, initial encounter  DX-LUMBAR SPINE-2 OR 3 VIEWS      2. Contusion of coccyx, initial encounter          Differential diagnosis, natural history, supportive care, and indications for immediate follow-up were discussed.     Ice and nsaid as needed             "

## 2023-03-09 ENCOUNTER — PHARMACY VISIT (OUTPATIENT)
Dept: PHARMACY | Facility: MEDICAL CENTER | Age: 43
End: 2023-03-09

## 2023-03-09 PROCEDURE — RXOTC WILLOW AMBULATORY OTC CHARGE: Performed by: PHARMACIST

## 2023-03-11 ENCOUNTER — PHARMACY VISIT (OUTPATIENT)
Dept: PHARMACY | Facility: MEDICAL CENTER | Age: 43
End: 2023-03-11
Payer: COMMERCIAL

## 2023-03-11 PROCEDURE — RXMED WILLOW AMBULATORY MEDICATION CHARGE: Performed by: PHYSICIAN ASSISTANT

## 2023-03-14 ENCOUNTER — PHARMACY VISIT (OUTPATIENT)
Dept: PHARMACY | Facility: MEDICAL CENTER | Age: 43
End: 2023-03-14

## 2023-03-14 PROCEDURE — RXOTC WILLOW AMBULATORY OTC CHARGE: Performed by: PHARMACIST

## 2023-03-31 ENCOUNTER — PHARMACY VISIT (OUTPATIENT)
Dept: PHARMACY | Facility: MEDICAL CENTER | Age: 43
End: 2023-03-31
Payer: COMMERCIAL

## 2023-03-31 PROCEDURE — RXMED WILLOW AMBULATORY MEDICATION CHARGE: Performed by: INTERNAL MEDICINE

## 2023-03-31 RX ORDER — INFLUENZA A VIRUS A/BRISBANE/02/2018 IVR-190 (H1N1) ANTIGEN (FORMALDEHYDE INACTIVATED), INFLUENZA A VIRUS A/KANSAS/14/2017 X-327 (H3N2) ANTIGEN (FORMALDEHYDE INACTIVATED), INFLUENZA B VIRUS B/PHUKET/3073/2013 ANTIGEN (FORMALDEHYDE INACTIVATED), AND INFLUENZA B VIRUS B/MARYLAND/15/2016 BX-69A ANTIGEN (FORMALDEHYDE INACTIVATED) 15; 15; 15; 15 UG/.5ML; UG/.5ML; UG/.5ML; UG/.5ML
INJECTION, SUSPENSION INTRAMUSCULAR
Qty: 0.5 ML | Refills: 0 | Status: SHIPPED | OUTPATIENT
Start: 2023-03-31 | End: 2023-04-28

## 2023-04-11 ENCOUNTER — PHARMACY VISIT (OUTPATIENT)
Dept: PHARMACY | Facility: MEDICAL CENTER | Age: 43
End: 2023-04-11

## 2023-04-11 PROCEDURE — RXOTC WILLOW AMBULATORY OTC CHARGE

## 2023-04-28 ENCOUNTER — OFFICE VISIT (OUTPATIENT)
Dept: MEDICAL GROUP | Facility: PHYSICIAN GROUP | Age: 43
End: 2023-04-28
Payer: COMMERCIAL

## 2023-04-28 VITALS
WEIGHT: 128 LBS | TEMPERATURE: 98.8 F | OXYGEN SATURATION: 100 % | DIASTOLIC BLOOD PRESSURE: 52 MMHG | HEART RATE: 63 BPM | HEIGHT: 64 IN | SYSTOLIC BLOOD PRESSURE: 94 MMHG | BODY MASS INDEX: 21.85 KG/M2

## 2023-04-28 DIAGNOSIS — Z00.00 WELLNESS EXAMINATION: Primary | ICD-10-CM

## 2023-04-28 DIAGNOSIS — Z12.31 ENCOUNTER FOR SCREENING MAMMOGRAM FOR MALIGNANT NEOPLASM OF BREAST: ICD-10-CM

## 2023-04-28 PROCEDURE — 99396 PREV VISIT EST AGE 40-64: CPT | Performed by: FAMILY MEDICINE

## 2023-04-28 RX ORDER — MAGNESIUM OXIDE 400 MG/1
400 TABLET ORAL DAILY
COMMUNITY

## 2023-04-28 ASSESSMENT — PATIENT HEALTH QUESTIONNAIRE - PHQ9: CLINICAL INTERPRETATION OF PHQ2 SCORE: 0

## 2023-04-28 ASSESSMENT — FIBROSIS 4 INDEX: FIB4 SCORE: 0.68

## 2023-04-28 NOTE — PROGRESS NOTES
Subjective:     CC:   Chief Complaint   Patient presents with    Annual Exam       HPI:   Afshan Huitron is a 41 y.o. female who presents for annual exam. She is feeling well and denies any complaints.    Ob-Gyn/ History:    Patient has GYN provider: yes  /Para:  0/0  Last Pap Smear:  . No history of abnormal pap smears.  Gyn Surgery:  Laparoscopy for endometriosis.  Current Contraceptive Method:  N/a. Not currently sexually active.  Last menstrual period:  1.5 years ago.  No significant bloating/fluid retention, pelvic pain, or dyspareunia. No vaginal discharge  Post-menopausal bleeding: n/a  Urinary incontinence: n/a  Folate intake: no     Health Maintenance  Advanced directive: n/a   Osteoporosis Screen/ DEXA: n/a   PT/vit D for falls prevention: n/a   Cholesterol Screenin2022 - T chol 222, TG 72, HDL 88, ; ASCVD 0.3%   Diabetes Screening: n/a - BMI <25   Aspirin Use: n/a      Anticipatory Guidance  Diet: trying to eat healthy  Exercise: regular - 3-4 days/week  Substance Abuse: no  Safe in relationship.   Seat belts, bike helmet, gun safety discussed.  Sun protection used.  Dental home.    Cancer screening  Colorectal Cancer Screening: due    Lung Cancer Screening: n/a - never smoker    Cervical Cancer Screening: per gynecology   Breast Cancer Screening: ordered    Infectious disease screening/Immunizations  --STI Screening: declined   --Practices safe sex.  --HIV Screening: declined   --Hepatitis C Screening: completed - neg ()   --Immunizations:    Influenza: completed    HPV:  n/a    Tetanus: due     Shingles: n/a    Pneumococcal : n/a   Hepatitis B: completed    COVID-19: bivalent booster recommeded  Other immunizations: n/a     She  has a past medical history of Endometriosis.  She  has a past surgical history that includes laparoscopy (); external fixator application (Left, 2017); and hardware removal ortho (Left, 2020).    Family History   Problem  Relation Age of Onset    Cancer Father         melanoma    Autoimmune Disease Maternal Aunt         RA    Stroke Maternal Aunt         hemorrhagic    Autoimmune Disease Maternal Aunt         RA    Kidney Disease Paternal Aunt         CKD maybe from NSAIDs    Heart Disease Other     Diabetes Neg Hx     Ovarian Cancer Neg Hx     Tubal Cancer Neg Hx     Peritoneal Cancer Neg Hx     Colorectal Cancer Neg Hx     Breast Cancer Neg Hx        Social History     Socioeconomic History    Marital status: Single     Spouse name: Not on file    Number of children: Not on file    Years of education: Not on file    Highest education level: Doctorate   Occupational History    Not on file   Tobacco Use    Smoking status: Never    Smokeless tobacco: Never   Vaping Use    Vaping Use: Never used   Substance and Sexual Activity    Alcohol use: No     Alcohol/week: 0.0 oz    Drug use: Never    Sexual activity: Not Currently     Partners: Male   Other Topics Concern    Not on file   Social History Narrative    Not on file     Social Determinants of Health     Financial Resource Strain: Not on file   Food Insecurity: Not on file   Transportation Needs: Not on file   Physical Activity: Not on file   Stress: Not on file   Social Connections: Not on file   Intimate Partner Violence: Not on file   Housing Stability: Not on file       Patient Active Problem List    Diagnosis Date Noted    Perimenopausal 09/24/2021    Jugular venous distension 09/24/2021    Macrocytosis 06/13/2017    Irritable bowel syndrome with constipation 10/26/2016         Current Outpatient Medications   Medication Sig Dispense Refill    magnesium oxide (MAG-OX) 400 MG Tab tablet Take 400 mg by mouth every day.       No current facility-administered medications for this visit.     No Known Allergies    Review of Systems   Constitutional: Negative for fever, chills.   HENT: Negative for congestion.    Eyes: Negative for pain.    Respiratory: Negative for shortness of  "breath.  Cardiovascular: Negative for leg swelling.   Gastrointestinal: Negative for nausea, vomiting.  Genitourinary: Negative for hematuria.   Skin: Negative for rash.   Neurological: Negative for dizziness.   Endo/Heme/Allergies: Does not bleed easily.   Psychiatric/Behavioral: Negative for depression.  The patient is not nervous/anxious.      Objective:     BP 94/52 (BP Location: Right arm, Patient Position: Sitting, BP Cuff Size: Adult)   Pulse 63   Temp 37.1 °C (98.8 °F) (Temporal)   Ht 1.626 m (5' 4\")   Wt 58.1 kg (128 lb)   SpO2 100%   BMI 21.97 kg/m²   Body mass index is 21.97 kg/m².  Wt Readings from Last 4 Encounters:   04/28/23 58.1 kg (128 lb)   03/11/23 58.1 kg (128 lb)   03/04/23 58.8 kg (129 lb 11.2 oz)   06/30/22 56.7 kg (125 lb)       Physical Exam:  Constitutional: Well-developed and well-nourished. Not diaphoretic. No distress.   Skin: Skin is warm and dry. No rash noted.  Head: Atraumatic without lesions.  Eyes: Conjunctivae and extraocular motions are normal. Pupils are equal, round, and reactive to light. No scleral icterus.   Ears:  External ears unremarkable. Tympanic membranes clear and intact.  Mouth/Throat: Dentition is good. Tongue normal. Oropharynx is clear and moist. Posterior pharynx without erythema or exudates.  Neck: Supple, trachea midline. Normal range of motion. No thyromegaly present. No lymphadenopathy--cervical or supraclavicular.  Cardiovascular: Regular rate and rhythm, S1 and S2 without murmur, rubs, or gallops.  Lungs: Normal inspiratory effort, CTA bilaterally, no wheezes/rhonchi/rales  Abdomen: Soft, non tender, and without distention. Active bowel sounds in all four quadrants. No rebound, guarding, masses or HSM.  Extremities: No cyanosis, clubbing, erythema, nor edema. Distal pulses intact and symmetric.   Musculoskeletal: All major joints AROM full in all directions without pain.  Neurological: Alert and oriented x 3. DTRs 2+/3 and symmetric. No cranial nerve " deficit. 5/5 myotomes. Sensation intact.   Psychiatric:  Behavior, mood, and affect are appropriate.    Assessment and Plan:     1. Wellness examination        2. Encounter for screening mammogram for malignant neoplasm of breast  MA-SCREENING MAMMO BILAT W/TOMOSYNTHESIS W/CAD        HCM:  Up to date   Labs per orders  Immunizations per orders  Patient counseled about skin care, diet, supplements, prenatal vitamins, safe sex and exercise.    Referral for genetic research was offered. Patient declined.      Follow-up: Return in about 1 year (around 4/28/2024) for Annual/wellness visit.

## 2023-05-12 ENCOUNTER — HOSPITAL ENCOUNTER (OUTPATIENT)
Dept: RADIOLOGY | Facility: MEDICAL CENTER | Age: 43
End: 2023-05-12
Attending: FAMILY MEDICINE
Payer: COMMERCIAL

## 2023-05-12 DIAGNOSIS — Z12.31 ENCOUNTER FOR SCREENING MAMMOGRAM FOR MALIGNANT NEOPLASM OF BREAST: ICD-10-CM

## 2023-05-12 PROCEDURE — 77063 BREAST TOMOSYNTHESIS BI: CPT

## 2023-05-22 ENCOUNTER — PHARMACY VISIT (OUTPATIENT)
Dept: PHARMACY | Facility: MEDICAL CENTER | Age: 43
End: 2023-05-22

## 2023-05-22 PROCEDURE — RXOTC WILLOW AMBULATORY OTC CHARGE

## 2023-07-06 DIAGNOSIS — D75.89 MACROCYTOSIS: ICD-10-CM

## 2023-07-06 DIAGNOSIS — E78.00 PURE HYPERCHOLESTEROLEMIA: ICD-10-CM

## 2023-07-06 DIAGNOSIS — R94.4 DECREASED GFR: ICD-10-CM

## 2023-07-21 ENCOUNTER — RESEARCH ENCOUNTER (OUTPATIENT)
Dept: RESEARCH | Facility: WORKSITE | Age: 43
End: 2023-07-21
Attending: FAMILY MEDICINE
Payer: COMMERCIAL

## 2023-07-21 NOTE — RESEARCH NOTE
Patient began consultation appointment, but would like to speak with primary care provider before consenting and enrolling;

## 2023-08-19 ENCOUNTER — HOSPITAL ENCOUNTER (OUTPATIENT)
Dept: LAB | Facility: MEDICAL CENTER | Age: 43
End: 2023-08-19
Attending: FAMILY MEDICINE
Payer: COMMERCIAL

## 2023-08-19 DIAGNOSIS — R94.4 DECREASED GFR: ICD-10-CM

## 2023-08-19 DIAGNOSIS — E78.00 PURE HYPERCHOLESTEROLEMIA: ICD-10-CM

## 2023-08-19 DIAGNOSIS — D75.89 MACROCYTOSIS: ICD-10-CM

## 2023-08-19 LAB
ALBUMIN SERPL BCP-MCNC: 4.5 G/DL (ref 3.2–4.9)
ALBUMIN/GLOB SERPL: 1.6 G/DL
ALP SERPL-CCNC: 98 U/L (ref 30–99)
ALT SERPL-CCNC: 44 U/L (ref 2–50)
ANION GAP SERPL CALC-SCNC: 13 MMOL/L (ref 7–16)
AST SERPL-CCNC: 37 U/L (ref 12–45)
BILIRUB SERPL-MCNC: 0.5 MG/DL (ref 0.1–1.5)
BUN SERPL-MCNC: 13 MG/DL (ref 8–22)
CALCIUM ALBUM COR SERPL-MCNC: 9.4 MG/DL (ref 8.5–10.5)
CALCIUM SERPL-MCNC: 9.8 MG/DL (ref 8.5–10.5)
CHLORIDE SERPL-SCNC: 100 MMOL/L (ref 96–112)
CHOLEST SERPL-MCNC: 228 MG/DL (ref 100–199)
CO2 SERPL-SCNC: 23 MMOL/L (ref 20–33)
CREAT SERPL-MCNC: 0.93 MG/DL (ref 0.5–1.4)
ERYTHROCYTE [DISTWIDTH] IN BLOOD BY AUTOMATED COUNT: 42.9 FL (ref 35.9–50)
FASTING STATUS PATIENT QL REPORTED: NORMAL
GFR SERPLBLD CREATININE-BSD FMLA CKD-EPI: 77 ML/MIN/1.73 M 2
GLOBULIN SER CALC-MCNC: 2.8 G/DL (ref 1.9–3.5)
GLUCOSE SERPL-MCNC: 72 MG/DL (ref 65–99)
HCT VFR BLD AUTO: 42 % (ref 37–47)
HDLC SERPL-MCNC: 75 MG/DL
HGB BLD-MCNC: 13.9 G/DL (ref 12–16)
LDLC SERPL CALC-MCNC: 136 MG/DL
MCH RBC QN AUTO: 33.1 PG (ref 27–33)
MCHC RBC AUTO-ENTMCNC: 33.1 G/DL (ref 32.2–35.5)
MCV RBC AUTO: 100 FL (ref 81.4–97.8)
PLATELET # BLD AUTO: 286 K/UL (ref 164–446)
PMV BLD AUTO: 10 FL (ref 9–12.9)
POTASSIUM SERPL-SCNC: 3.9 MMOL/L (ref 3.6–5.5)
PROT SERPL-MCNC: 7.3 G/DL (ref 6–8.2)
RBC # BLD AUTO: 4.2 M/UL (ref 4.2–5.4)
SODIUM SERPL-SCNC: 136 MMOL/L (ref 135–145)
TRIGL SERPL-MCNC: 85 MG/DL (ref 0–149)
WBC # BLD AUTO: 8.2 K/UL (ref 4.8–10.8)

## 2023-08-19 PROCEDURE — 85027 COMPLETE CBC AUTOMATED: CPT

## 2023-08-19 PROCEDURE — 36415 COLL VENOUS BLD VENIPUNCTURE: CPT

## 2023-08-19 PROCEDURE — 80053 COMPREHEN METABOLIC PANEL: CPT

## 2023-08-19 PROCEDURE — 80061 LIPID PANEL: CPT

## 2023-09-11 ENCOUNTER — PHARMACY VISIT (OUTPATIENT)
Dept: PHARMACY | Facility: MEDICAL CENTER | Age: 43
End: 2023-09-11

## 2023-09-11 PROCEDURE — RXOTC WILLOW AMBULATORY OTC CHARGE: Performed by: PHARMACIST

## 2023-10-02 ENCOUNTER — IMMUNIZATION (OUTPATIENT)
Dept: OCCUPATIONAL MEDICINE | Facility: CLINIC | Age: 43
End: 2023-10-02

## 2023-10-02 DIAGNOSIS — Z23 NEED FOR VACCINATION: Primary | ICD-10-CM

## 2023-10-02 PROCEDURE — 90686 IIV4 VACC NO PRSV 0.5 ML IM: CPT | Performed by: PREVENTIVE MEDICINE

## 2023-11-21 ENCOUNTER — PHARMACY VISIT (OUTPATIENT)
Dept: PHARMACY | Facility: MEDICAL CENTER | Age: 43
End: 2023-11-21

## 2023-11-21 PROCEDURE — RXOTC WILLOW AMBULATORY OTC CHARGE

## 2023-11-29 ENCOUNTER — PHARMACY VISIT (OUTPATIENT)
Dept: PHARMACY | Facility: MEDICAL CENTER | Age: 43
End: 2023-11-29

## 2023-11-29 PROCEDURE — RXOTC WILLOW AMBULATORY OTC CHARGE: Performed by: PHARMACIST

## 2023-12-01 ENCOUNTER — APPOINTMENT (OUTPATIENT)
Dept: URGENT CARE | Facility: CLINIC | Age: 43
End: 2023-12-01
Payer: COMMERCIAL

## 2023-12-06 ENCOUNTER — PHARMACY VISIT (OUTPATIENT)
Dept: PHARMACY | Facility: MEDICAL CENTER | Age: 43
End: 2023-12-06

## 2023-12-06 PROCEDURE — RXOTC WILLOW AMBULATORY OTC CHARGE

## 2024-02-01 ENCOUNTER — PHARMACY VISIT (OUTPATIENT)
Dept: PHARMACY | Facility: MEDICAL CENTER | Age: 44
End: 2024-02-01

## 2024-02-01 PROCEDURE — RXOTC WILLOW AMBULATORY OTC CHARGE: Performed by: PHARMACIST

## 2024-04-08 ENCOUNTER — PHARMACY VISIT (OUTPATIENT)
Dept: PHARMACY | Facility: MEDICAL CENTER | Age: 44
End: 2024-04-08

## 2024-04-08 PROCEDURE — RXOTC WILLOW AMBULATORY OTC CHARGE: Performed by: PHARMACIST

## 2024-05-10 ENCOUNTER — OFFICE VISIT (OUTPATIENT)
Dept: MEDICAL GROUP | Facility: PHYSICIAN GROUP | Age: 44
End: 2024-05-10
Payer: COMMERCIAL

## 2024-05-10 VITALS
HEIGHT: 64 IN | OXYGEN SATURATION: 98 % | DIASTOLIC BLOOD PRESSURE: 62 MMHG | SYSTOLIC BLOOD PRESSURE: 106 MMHG | HEART RATE: 64 BPM | TEMPERATURE: 97.8 F | WEIGHT: 133.8 LBS | BODY MASS INDEX: 22.84 KG/M2

## 2024-05-10 DIAGNOSIS — R39.15 URINARY URGENCY: ICD-10-CM

## 2024-05-10 DIAGNOSIS — Z00.00 WELLNESS EXAMINATION: Primary | ICD-10-CM

## 2024-05-10 PROBLEM — Z78.0 POSTMENOPAUSAL: Status: ACTIVE | Noted: 2021-09-24

## 2024-05-10 PROCEDURE — 3074F SYST BP LT 130 MM HG: CPT | Performed by: FAMILY MEDICINE

## 2024-05-10 PROCEDURE — 99396 PREV VISIT EST AGE 40-64: CPT | Performed by: FAMILY MEDICINE

## 2024-05-10 PROCEDURE — 3078F DIAST BP <80 MM HG: CPT | Performed by: FAMILY MEDICINE

## 2024-05-10 RX ORDER — PHENOL 1.4 %
AEROSOL, SPRAY (ML) MUCOUS MEMBRANE
COMMUNITY

## 2024-05-10 ASSESSMENT — FIBROSIS 4 INDEX: FIB4 SCORE: 0.88

## 2024-05-10 ASSESSMENT — PATIENT HEALTH QUESTIONNAIRE - PHQ9: CLINICAL INTERPRETATION OF PHQ2 SCORE: 0

## 2024-05-10 NOTE — PROGRESS NOTES
Subjective:     CC:   Chief Complaint   Patient presents with    Annual Exam       HPI:   Afshan Huitron is a 43 y.o. female who presents for annual exam. She is feeling well and denies any complaints.    Ob-Gyn/ History:    Patient has GYN provider: yes  /Para:  0/0  Last Pap Smear:  . No history of abnormal pap smears.  Gyn Surgery:  Laparoscopy for endometriosis.  Current Contraceptive Method:  N/a. Not currently sexually active.  Last menstrual period:  .  No significant bloating/fluid retention, pelvic pain, or dyspareunia. No vaginal discharge  Post-menopausal bleeding: no  Urinary incontinence:   Folate intake: no     Health Maintenance  Advanced directive: n/a   Osteoporosis Screen/ DEXA: n/a   PT/vit D for falls prevention: n/a   Cholesterol Screenin2023 - T chol 228, TG 85, HDL 75, ; ASCVD 0.3%   Diabetes Screening: n/a - BMI <25   Aspirin Use: n/a      Anticipatory Guidance  Diet: trying to eat healthy  Exercise: regular - 3 days/week  Substance Abuse: no  Safe in relationship.   Seat belts, bike helmet, gun safety discussed.  Sun protection used.  Dental home.    Cancer screening  Colorectal Cancer Screening: due    Lung Cancer Screening: n/a - never smoker    Cervical Cancer Screening: due   Breast Cancer Screening: scheduled    Infectious disease screening/Immunizations  --STI Screening: declined   --Practices safe sex.  --HIV Screening: reports completed - neg   --Hepatitis C Screening: completed - neg ()   --Immunizations:    Influenza: completed    HPV:  n/a    Tetanus: due     Shingles: n/a    Pneumococcal : n/a   Hepatitis B: completed    COVID-19: reports completed  Other immunizations: n/a     She  has a past medical history of Endometriosis.  She  has a past surgical history that includes laparoscopy (); external fixator application (Left, 2017); and hardware removal ortho (Left, 2020).    Family History   Problem Relation Age of  Onset    Cancer Father         melanoma    Autoimmune Disease Maternal Aunt         RA    Stroke Maternal Aunt         hemorrhagic    Autoimmune Disease Maternal Aunt         RA    Kidney Disease Paternal Aunt         CKD maybe from NSAIDs    Heart Disease Other     Diabetes Neg Hx     Ovarian Cancer Neg Hx     Tubal Cancer Neg Hx     Peritoneal Cancer Neg Hx     Colorectal Cancer Neg Hx     Breast Cancer Neg Hx        Social History     Socioeconomic History    Marital status: Single     Spouse name: Not on file    Number of children: Not on file    Years of education: Not on file    Highest education level: Doctorate   Occupational History    Not on file   Tobacco Use    Smoking status: Never    Smokeless tobacco: Never   Vaping Use    Vaping Use: Never used   Substance and Sexual Activity    Alcohol use: No     Alcohol/week: 0.0 oz    Drug use: Never    Sexual activity: Not Currently     Partners: Male   Other Topics Concern    Not on file   Social History Narrative    Not on file     Social Determinants of Health     Financial Resource Strain: Low Risk  (3/29/2022)    Overall Financial Resource Strain (CARDIA)     Difficulty of Paying Living Expenses: Not hard at all   Food Insecurity: No Food Insecurity (3/29/2022)    Hunger Vital Sign     Worried About Running Out of Food in the Last Year: Never true     Ran Out of Food in the Last Year: Never true   Transportation Needs: No Transportation Needs (3/29/2022)    PRAPARE - Transportation     Lack of Transportation (Medical): No     Lack of Transportation (Non-Medical): No   Physical Activity: Sufficiently Active (3/29/2022)    Exercise Vital Sign     Days of Exercise per Week: 4 days     Minutes of Exercise per Session: 50 min   Stress: No Stress Concern Present (3/29/2022)    [employer] [place] [employer] - Occupational Stress Questionnaire     Feeling of Stress : Only a little   Social Connections: Moderately Integrated (3/29/2022)    Social  "Connection and Isolation Panel [NHANES]     Frequency of Communication with Friends and Family: Three times a week     Frequency of Social Gatherings with Friends and Family: Three times a week     Attends Christian Services: More than 4 times per year     Active Member of Clubs or Organizations: Yes     Attends Club or Organization Meetings: More than 4 times per year     Marital Status: Never    Intimate Partner Violence: Not on file   Housing Stability: Low Risk  (3/29/2022)    Housing Stability Vital Sign     Unable to Pay for Housing in the Last Year: No     Number of Places Lived in the Last Year: 1     Unstable Housing in the Last Year: No       Patient Active Problem List    Diagnosis Date Noted    Postmenopausal 09/24/2021    Jugular venous distension 09/24/2021    Macrocytosis 06/13/2017    Irritable bowel syndrome with constipation 10/26/2016         Current Outpatient Medications   Medication Sig Dispense Refill    Multiple Vitamins-Minerals (MULTIVITAMIN WOMEN) Tab Take  by mouth.      magnesium oxide (MAG-OX) 400 MG Tab tablet Take 400 mg by mouth 1 time a day as needed (constipation).       No current facility-administered medications for this visit.     No Known Allergies    Review of Systems   Constitutional: Negative for fever, chills.   HENT: Negative for congestion.    Eyes: Negative for pain.    Respiratory: Negative for shortness of breath.  Cardiovascular: Negative for leg swelling.   Gastrointestinal: Negative for nausea, vomiting.  Genitourinary: Negative for hematuria.   Skin: Negative for rash.   Neurological: Negative for dizziness.   Endo/Heme/Allergies: Does not bleed easily.   Psychiatric/Behavioral: Negative for depression.  The patient is not nervous/anxious.      Objective:     /62 (BP Location: Right arm, Patient Position: Sitting, BP Cuff Size: Adult)   Pulse 64   Temp 36.6 °C (97.8 °F) (Temporal)   Ht 1.626 m (5' 4\")   Wt 60.7 kg (133 lb 12.8 oz)   SpO2 98%   " BMI 22.97 kg/m²   Body mass index is 22.97 kg/m².  Wt Readings from Last 4 Encounters:   05/10/24 60.7 kg (133 lb 12.8 oz)   12/01/23 61.2 kg (135 lb)   04/28/23 58.1 kg (128 lb)   03/11/23 58.1 kg (128 lb)       Physical Exam:  Constitutional: Well-developed and well-nourished. Not diaphoretic. No distress.   Skin: Skin is warm and dry. No rash noted.  Head: Atraumatic without lesions.  Eyes: Conjunctivae and extraocular motions are normal. Pupils are equal, round, and reactive to light. No scleral icterus.   Ears:  External ears unremarkable. Tympanic membranes clear and intact.  Mouth/Throat: Dentition is good. Tongue normal. Oropharynx is clear and moist. Posterior pharynx without erythema or exudates.  Neck: Supple, trachea midline. Normal range of motion. No thyromegaly present. No lymphadenopathy--cervical or supraclavicular.  Cardiovascular: Regular rate and rhythm, S1 and S2 without murmur, rubs, or gallops.  Lungs: Normal inspiratory effort, CTA bilaterally, no wheezes/rhonchi/rales  Abdomen: Soft, non tender, and without distention. Active bowel sounds in all four quadrants. No rebound, guarding, masses or HSM.  Extremities: No cyanosis, clubbing, erythema, nor edema. Distal pulses intact and symmetric.   Musculoskeletal: All major joints AROM full in all directions without pain.  Neurological: Alert and oriented x 3. DTRs 2+/3 and symmetric. No cranial nerve deficit. 5/5 myotomes. Sensation intact.   Psychiatric:  Behavior, mood, and affect are appropriate.    Assessment and Plan:     1. Wellness examination        2. Urinary urgency  Referral to Physical Therapy          HCM:  Up to date   Labs per orders  Immunizations per orders  Patient counseled about skin care, diet, supplements, prenatal vitamins, safe sex and exercise.    Referral for genetic research was offered. Patient declined.      Follow-up: Return in about 1 year (around 5/10/2025) for Annual/wellness visit.

## 2024-05-31 ENCOUNTER — HOSPITAL ENCOUNTER (OUTPATIENT)
Dept: RADIOLOGY | Facility: MEDICAL CENTER | Age: 44
End: 2024-05-31
Attending: FAMILY MEDICINE
Payer: COMMERCIAL

## 2024-05-31 DIAGNOSIS — Z12.31 VISIT FOR SCREENING MAMMOGRAM: ICD-10-CM

## 2024-05-31 PROCEDURE — 77063 BREAST TOMOSYNTHESIS BI: CPT

## 2024-06-24 ENCOUNTER — PHARMACY VISIT (OUTPATIENT)
Dept: PHARMACY | Facility: MEDICAL CENTER | Age: 44
End: 2024-06-24

## 2024-06-24 PROCEDURE — RXOTC WILLOW AMBULATORY OTC CHARGE: Performed by: PHARMACIST

## 2024-07-03 ENCOUNTER — PHARMACY VISIT (OUTPATIENT)
Dept: PHARMACY | Facility: MEDICAL CENTER | Age: 44
End: 2024-07-03

## 2024-07-03 PROCEDURE — RXOTC WILLOW AMBULATORY OTC CHARGE

## 2024-07-16 ENCOUNTER — PHARMACY VISIT (OUTPATIENT)
Dept: PHARMACY | Facility: MEDICAL CENTER | Age: 44
End: 2024-07-16

## 2024-07-16 PROCEDURE — RXOTC WILLOW AMBULATORY OTC CHARGE

## 2024-08-05 DIAGNOSIS — D75.89 MACROCYTOSIS: ICD-10-CM

## 2024-08-05 DIAGNOSIS — E78.00 PURE HYPERCHOLESTEROLEMIA: ICD-10-CM

## 2024-08-14 ENCOUNTER — PHARMACY VISIT (OUTPATIENT)
Dept: PHARMACY | Facility: MEDICAL CENTER | Age: 44
End: 2024-08-14
Payer: COMMERCIAL

## 2024-08-14 PROCEDURE — RXOTC WILLOW AMBULATORY OTC CHARGE

## 2024-09-13 ENCOUNTER — HOSPITAL ENCOUNTER (OUTPATIENT)
Dept: LAB | Facility: MEDICAL CENTER | Age: 44
End: 2024-09-13
Attending: FAMILY MEDICINE
Payer: COMMERCIAL

## 2024-09-13 DIAGNOSIS — D75.89 MACROCYTOSIS: ICD-10-CM

## 2024-09-13 DIAGNOSIS — E78.00 PURE HYPERCHOLESTEROLEMIA: ICD-10-CM

## 2024-09-13 LAB
ALBUMIN SERPL BCP-MCNC: 4.3 G/DL (ref 3.2–4.9)
ALBUMIN/GLOB SERPL: 1.4 G/DL
ALP SERPL-CCNC: 94 U/L (ref 30–99)
ALT SERPL-CCNC: 60 U/L (ref 2–50)
ANION GAP SERPL CALC-SCNC: 10 MMOL/L (ref 7–16)
AST SERPL-CCNC: 52 U/L (ref 12–45)
BILIRUB SERPL-MCNC: 0.3 MG/DL (ref 0.1–1.5)
BUN SERPL-MCNC: 17 MG/DL (ref 8–22)
CALCIUM ALBUM COR SERPL-MCNC: 9.4 MG/DL (ref 8.5–10.5)
CALCIUM SERPL-MCNC: 9.6 MG/DL (ref 8.5–10.5)
CHLORIDE SERPL-SCNC: 99 MMOL/L (ref 96–112)
CHOLEST SERPL-MCNC: 241 MG/DL (ref 100–199)
CO2 SERPL-SCNC: 26 MMOL/L (ref 20–33)
CREAT SERPL-MCNC: 1.01 MG/DL (ref 0.5–1.4)
ERYTHROCYTE [DISTWIDTH] IN BLOOD BY AUTOMATED COUNT: 42.3 FL (ref 35.9–50)
FASTING STATUS PATIENT QL REPORTED: NORMAL
GFR SERPLBLD CREATININE-BSD FMLA CKD-EPI: 69 ML/MIN/1.73 M 2
GLOBULIN SER CALC-MCNC: 3.1 G/DL (ref 1.9–3.5)
GLUCOSE SERPL-MCNC: 85 MG/DL (ref 65–99)
HCT VFR BLD AUTO: 41.8 % (ref 37–47)
HDLC SERPL-MCNC: 90 MG/DL
HGB BLD-MCNC: 14.1 G/DL (ref 12–16)
LDLC SERPL CALC-MCNC: 142 MG/DL
MCH RBC QN AUTO: 33.7 PG (ref 27–33)
MCHC RBC AUTO-ENTMCNC: 33.7 G/DL (ref 32.2–35.5)
MCV RBC AUTO: 99.8 FL (ref 81.4–97.8)
PLATELET # BLD AUTO: 263 K/UL (ref 164–446)
PMV BLD AUTO: 10 FL (ref 9–12.9)
POTASSIUM SERPL-SCNC: 4.8 MMOL/L (ref 3.6–5.5)
PROT SERPL-MCNC: 7.4 G/DL (ref 6–8.2)
RBC # BLD AUTO: 4.19 M/UL (ref 4.2–5.4)
SODIUM SERPL-SCNC: 135 MMOL/L (ref 135–145)
TRIGL SERPL-MCNC: 46 MG/DL (ref 0–149)
WBC # BLD AUTO: 5.3 K/UL (ref 4.8–10.8)

## 2024-09-13 PROCEDURE — 80053 COMPREHEN METABOLIC PANEL: CPT

## 2024-09-13 PROCEDURE — 85027 COMPLETE CBC AUTOMATED: CPT

## 2024-09-13 PROCEDURE — 80061 LIPID PANEL: CPT

## 2024-09-13 PROCEDURE — 36415 COLL VENOUS BLD VENIPUNCTURE: CPT

## 2024-09-19 PROBLEM — N18.2 CKD (CHRONIC KIDNEY DISEASE) STAGE 2, GFR 60-89 ML/MIN: Status: ACTIVE | Noted: 2024-09-19

## 2024-09-20 ENCOUNTER — OFFICE VISIT (OUTPATIENT)
Dept: NEPHROLOGY | Facility: MEDICAL CENTER | Age: 44
End: 2024-09-20
Payer: COMMERCIAL

## 2024-09-20 VITALS
TEMPERATURE: 98.1 F | HEIGHT: 64 IN | WEIGHT: 132 LBS | HEART RATE: 63 BPM | DIASTOLIC BLOOD PRESSURE: 64 MMHG | OXYGEN SATURATION: 99 % | SYSTOLIC BLOOD PRESSURE: 108 MMHG | BODY MASS INDEX: 22.53 KG/M2

## 2024-09-20 DIAGNOSIS — N18.2 CKD (CHRONIC KIDNEY DISEASE) STAGE 2, GFR 60-89 ML/MIN: ICD-10-CM

## 2024-09-20 PROCEDURE — 99213 OFFICE O/P EST LOW 20 MIN: CPT | Performed by: INTERNAL MEDICINE

## 2024-09-20 PROCEDURE — 3074F SYST BP LT 130 MM HG: CPT | Performed by: INTERNAL MEDICINE

## 2024-09-20 PROCEDURE — 3078F DIAST BP <80 MM HG: CPT | Performed by: INTERNAL MEDICINE

## 2024-09-20 RX ORDER — ZINC SULFATE 50(220)MG
50 CAPSULE ORAL DAILY
COMMUNITY

## 2024-09-20 ASSESSMENT — ENCOUNTER SYMPTOMS
FEVER: 0
SHORTNESS OF BREATH: 1
ABDOMINAL PAIN: 0

## 2024-09-20 ASSESSMENT — FIBROSIS 4 INDEX: FIB4 SCORE: 1.17

## 2024-09-20 NOTE — PROGRESS NOTES
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     Re: CKD, she denies history of JEF. Denies kidney stones. Denies pregnancy. 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 She still exercises doing cardio, yoga, pickleball. She does resistance training once a week. Her job is working in pharmacy, still on her feet all day. Creatinine clearance 7/2022 very close to eGFR. She had early menopause, but when she had dysmenorrhea she used to take naproxen 440mg BID for a few days every month. She does say she does not hydrate well, urine is bright yellow because of a multivitamin.  She denies history of HTN, DM2.     Re: hypotension, says her BP \"always has been low.\" Says it's usually 100/65. Denies light-headedness most of the time.       Past Medical History:   Diagnosis Date    Endometriosis        Past Surgical History:   Procedure Laterality Date    HARDWARE REMOVAL ORTHO Left 02/2020    tib-fib    EXTERNAL FIXATOR APPLICATION Left 11/11/2017    Procedure: EXTERNAL FIXATOR APPLICATION;  Surgeon: Ino Smith M.D.;  Location: SURGERY [facility];  Service: Orthopedics    LAPAROSCOPY  2004    endometriosis        Outpatient Encounter Medications as of 9/20/2024   Medication Sig Dispense Refill    zinc sulfate (ZINCATE) 220 (50 Zn) MG Cap Take 50 mg by mouth every day.      Multiple Vitamins-Minerals (MULTIVITAMIN WOMEN) Tab Take  by mouth.      magnesium oxide (MAG-OX) 400 MG Tab tablet Take 400 mg by mouth 1 time a day as needed (constipation).       No facility-administered encounter medications on file as of 9/20/2024.        No Known Allergies          Family History   Problem Relation Age of Onset    Cancer Father         melanoma    Autoimmune Disease Maternal Aunt         RA    Stroke Maternal Aunt         hemorrhagic    " "Autoimmune Disease Maternal Aunt         RA    Kidney Disease Paternal Aunt         CKD maybe from NSAIDs    Heart Disease Other     Diabetes Neg Hx     Ovarian Cancer Neg Hx     Tubal Cancer Neg Hx     Peritoneal Cancer Neg Hx     Colorectal Cancer Neg Hx     Breast Cancer Neg Hx        Review of Systems   Constitutional:  Negative for fever.   Respiratory:  Positive for shortness of breath (more shallow when I wake up from sleep).    Cardiovascular:  Negative for chest pain.   Gastrointestinal:  Negative for abdominal pain.   Genitourinary:  Negative for dysuria and hematuria.   All other systems reviewed and are negative.      /64 (BP Location: Right arm, Patient Position: Sitting, BP Cuff Size: Adult)   Pulse 63   Temp 36.7 °C (98.1 °F) (Temporal)   Ht 1.626 m (5' 4\")   Wt 59.9 kg (132 lb)   SpO2 99%   BMI 22.66 kg/m²     Physical Exam  Constitutional:       General: She is not in acute distress.  HENT:      Mouth/Throat:      Pharynx: No oropharyngeal exudate.   Eyes:      General: No scleral icterus.  Neck:      Trachea: No tracheal deviation.   Cardiovascular:      Rate and Rhythm: Normal rate and regular rhythm.      Heart sounds: Normal heart sounds. No murmur heard.  Pulmonary:      Effort: Pulmonary effort is normal.      Breath sounds: Normal breath sounds. No stridor. No rales.   Abdominal:      General: Bowel sounds are normal.      Palpations: Abdomen is soft.      Tenderness: There is no abdominal tenderness.   Musculoskeletal:         General: Normal range of motion.      Cervical back: Neck supple.      Right lower leg: No edema.      Left lower leg: No edema.   Skin:     General: Skin is warm and dry.      Findings: No rash.   Neurological:      General: No focal deficit present.      Mental Status: She is alert and oriented to person, place, and time.   Psychiatric:         Mood and Affect: Mood and affect normal.         Behavior: Behavior normal.         Labs reviewed.    Recent " Labs     09/13/24  0838   ALBUMIN 4.3   HDL 90   TRIGLYCERIDE 46   SODIUM 135   POTASSIUM 4.8   CHLORIDE 99   CO2 26   BUN 17   CREATININE 1.01       Lab Results   Component Value Date/Time    WBC 5.3 09/13/2024 08:38 AM    RBC 4.19 (L) 09/13/2024 08:38 AM    HEMOGLOBIN 14.1 09/13/2024 08:38 AM    HEMATOCRIT 41.8 09/13/2024 08:38 AM    MCV 99.8 (H) 09/13/2024 08:38 AM    MCH 33.7 (H) 09/13/2024 08:38 AM    MCHC 33.7 09/13/2024 08:38 AM    MPV 10.0 09/13/2024 08:38 AM             URINALYSIS:  Lab Results   Component Value Date/Time    COLORURINE Yellow 07/02/2022 0817    CLARITY Clear 07/02/2022 0817    SPECGRAVITY 1.005 07/02/2022 0817    PHURINE 6.5 07/02/2022 0817    KETONES Negative 07/02/2022 0817    PROTEINURIN Negative 07/02/2022 0817    BILIRUBINUR Negative 07/02/2022 0817    UROBILU 0.2 07/02/2022 0817    NITRITE Negative 07/02/2022 0817    LEUKESTERAS Negative 07/02/2022 0817    OCCULTBLOOD Negative 07/02/2022 0817       UPC  Lab Results   Component Value Date/Time    TOTPROTUR <4.0 07/02/2022 0817      Lab Results   Component Value Date/Time    CREATININEU 15.84 07/02/2022 0817         Imaging reviewed  No orders to display         Assessment:  Afshan Huitron is a 41 y.o. female with dysmenorrhea/endometriosis who presents today to establish nephrology care.     Plan:  1. 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  I recommend avoiding NSAIDs and other nephrotoxins.  I recommend a low-salt diet.  I explained the importance of blood pressure control to help slow CKD progression.  Patient actually has mildly low blood pressure, so I do not recommend any antihypertensive medications.  Her creatinine is quite stable going all the way back to 2005.  Therefore, I am not concerned with worsening CKD.  Urinalysis in 2022 without hematuria or proteinuria.  Creatinine clearance test in July 2022 relatively close to estimated GFR.    2.  Hypotension  -  Asymptomatic.  She has an active lifestyle, and is asymptomatic from her low blood pressure.  Recommend continuing to monitor, continuing to hydrate well.    Return to clinic in 2 years with pre-clinic labs    Sergio Morin MD  Nephrology  [facility] the [facility], [place]

## 2024-09-20 NOTE — PATIENT INSTRUCTIONS
"\"Whole-food, plant-based diet\"    The American College of Lifestyle Medicine (ACLM) recommends a whole-food, plant-based diet for prevention and treatment of many diseases including cardiovascular disease, type 2 diabetes, obesity. Plant-based diets can help slow down, and potentially improve, chronic kidney disease.   https://lifestylemedicine.org/articles/benefits-plant-based-nutrition-chronic-kidney-disease/    https://www.[employer]/slow-cooker-coconut-machuca-lentils/  [email or web address]://[email or web address]/african-peanut-stew-[email or web address]    "

## 2024-09-30 ENCOUNTER — PHARMACY VISIT (OUTPATIENT)
Dept: PHARMACY | Facility: MEDICAL CENTER | Age: 44
End: 2024-09-30
Payer: COMMERCIAL

## 2024-09-30 PROCEDURE — RXOTC WILLOW AMBULATORY OTC CHARGE: Performed by: PHARMACIST

## 2024-10-02 ENCOUNTER — PHARMACY VISIT (OUTPATIENT)
Dept: PHARMACY | Facility: MEDICAL CENTER | Age: 44
End: 2024-10-02
Payer: COMMERCIAL

## 2024-10-02 PROCEDURE — RXOTC WILLOW AMBULATORY OTC CHARGE

## 2024-10-11 ENCOUNTER — OFFICE VISIT (OUTPATIENT)
Dept: DERMATOLOGY | Facility: IMAGING CENTER | Age: 44
End: 2024-10-11
Payer: COMMERCIAL

## 2024-10-11 DIAGNOSIS — L82.1 SEBORRHEIC KERATOSIS: ICD-10-CM

## 2024-10-11 DIAGNOSIS — Z12.83 SKIN CANCER SCREENING: ICD-10-CM

## 2024-10-11 DIAGNOSIS — L90.8 SKIN AGING: ICD-10-CM

## 2024-10-11 DIAGNOSIS — D18.01 CHERRY ANGIOMA: ICD-10-CM

## 2024-10-11 DIAGNOSIS — D22.9 NEVUS: ICD-10-CM

## 2024-10-11 DIAGNOSIS — L81.4 LENTIGO: ICD-10-CM

## 2024-10-11 PROCEDURE — 99212 OFFICE O/P EST SF 10 MIN: CPT | Performed by: DERMATOLOGY

## 2024-10-16 PROCEDURE — RXMED WILLOW AMBULATORY MEDICATION CHARGE: Performed by: INTERNAL MEDICINE

## 2024-10-21 ENCOUNTER — PHARMACY VISIT (OUTPATIENT)
Dept: PHARMACY | Facility: MEDICAL CENTER | Age: 44
End: 2024-10-21
Payer: COMMERCIAL

## 2024-10-21 RX ORDER — INFLUENZA A VIRUS A/VICTORIA/4897/2022 IVR-238 (H1N1) ANTIGEN (FORMALDEHYDE INACTIVATED), INFLUENZA A VIRUS A/CALIFORNIA/122/2022 SAN-022 (H3N2) ANTIGEN (FORMALDEHYDE INACTIVATED), AND INFLUENZA B VIRUS B/MICHIGAN/01/2021 ANTIGEN (FORMALDEHYDE INACTIVATED) 15; 15; 15 MG/.5ML; MG/.5ML; MG/.5ML
INJECTION, SUSPENSION INTRAMUSCULAR
Qty: 0.5 ML | Refills: 0 | OUTPATIENT
Start: 2024-10-16

## 2024-11-15 PROCEDURE — RXMED WILLOW AMBULATORY MEDICATION CHARGE: Performed by: OBSTETRICS & GYNECOLOGY

## 2024-11-18 ENCOUNTER — PHARMACY VISIT (OUTPATIENT)
Dept: PHARMACY | Facility: MEDICAL CENTER | Age: 44
End: 2024-11-18
Payer: COMMERCIAL

## 2024-12-09 ENCOUNTER — PHARMACY VISIT (OUTPATIENT)
Dept: PHARMACY | Facility: MEDICAL CENTER | Age: 44
End: 2024-12-09
Payer: COMMERCIAL

## 2024-12-09 PROCEDURE — RXMED WILLOW AMBULATORY MEDICATION CHARGE

## 2024-12-09 RX ORDER — SUMATRIPTAN SUCCINATE 100 MG/1
100 TABLET ORAL
Qty: 6 TABLET | Refills: 0 | OUTPATIENT
Start: 2024-12-09

## 2025-01-03 ENCOUNTER — HOSPITAL ENCOUNTER (OUTPATIENT)
Dept: RADIOLOGY | Facility: MEDICAL CENTER | Age: 45
End: 2025-01-03
Attending: OBSTETRICS & GYNECOLOGY
Payer: COMMERCIAL

## 2025-01-03 DIAGNOSIS — Z13.820 SCREENING FOR OSTEOPOROSIS: ICD-10-CM

## 2025-01-03 PROCEDURE — 77080 DXA BONE DENSITY AXIAL: CPT

## 2025-02-21 ENCOUNTER — PHARMACY VISIT (OUTPATIENT)
Dept: PHARMACY | Facility: MEDICAL CENTER | Age: 45
End: 2025-02-21
Payer: COMMERCIAL

## 2025-02-21 PROCEDURE — RXOTC WILLOW AMBULATORY OTC CHARGE

## 2025-05-16 ENCOUNTER — OFFICE VISIT (OUTPATIENT)
Dept: MEDICAL GROUP | Facility: PHYSICIAN GROUP | Age: 45
End: 2025-05-16
Payer: COMMERCIAL

## 2025-05-16 VITALS
WEIGHT: 134.6 LBS | HEART RATE: 64 BPM | SYSTOLIC BLOOD PRESSURE: 112 MMHG | HEIGHT: 64 IN | BODY MASS INDEX: 22.98 KG/M2 | DIASTOLIC BLOOD PRESSURE: 64 MMHG | OXYGEN SATURATION: 96 %

## 2025-05-16 DIAGNOSIS — Z12.31 ENCOUNTER FOR SCREENING MAMMOGRAM FOR MALIGNANT NEOPLASM OF BREAST: ICD-10-CM

## 2025-05-16 DIAGNOSIS — Z00.00 WELLNESS EXAMINATION: Primary | ICD-10-CM

## 2025-05-16 PROCEDURE — RXMED WILLOW AMBULATORY MEDICATION CHARGE: Performed by: FAMILY MEDICINE

## 2025-05-16 PROCEDURE — 3074F SYST BP LT 130 MM HG: CPT | Performed by: FAMILY MEDICINE

## 2025-05-16 PROCEDURE — 3078F DIAST BP <80 MM HG: CPT | Performed by: FAMILY MEDICINE

## 2025-05-16 PROCEDURE — 99396 PREV VISIT EST AGE 40-64: CPT | Performed by: FAMILY MEDICINE

## 2025-05-16 RX ORDER — SUMATRIPTAN SUCCINATE 100 MG/1
100 TABLET ORAL
Qty: 9 TABLET | Refills: 2 | Status: SHIPPED | OUTPATIENT
Start: 2025-05-16

## 2025-05-16 ASSESSMENT — PATIENT HEALTH QUESTIONNAIRE - PHQ9: CLINICAL INTERPRETATION OF PHQ2 SCORE: 0

## 2025-05-16 ASSESSMENT — FIBROSIS 4 INDEX: FIB4 SCORE: 1.17

## 2025-05-16 NOTE — PROGRESS NOTES
Subjective:     CC:   Chief Complaint   Patient presents with    Annual Exam       HPI:   Afshan Huitron is a 44 y.o. female who presents for annual exam. She is feeling well and denies any complaints.    Ob-Gyn/ History:    Patient has GYN provider: yes - Dr. Lopez  /Para:  0/0  Last Pap Smear:  . No history of abnormal pap smears.  Gyn Surgery:  Laparoscopy for endometriosis.  Current Contraceptive Method:  N/a. Not currently sexually active.  Last menstrual period:  .  No significant bloating/fluid retention, pelvic pain, or dyspareunia. No vaginal discharge  Post-menopausal bleeding: no  Urinary incontinence:   Folate intake: no     Health Maintenance  Advanced directive: n/a   Osteoporosis Screen/ DEXA: n/a   PT/vit D for falls prevention: n/a   Cholesterol Screenin2024 - T chol 241, TG 46, HDL 90, ; ASCVD 0.4%   Diabetes Screening: n/a - BMI <25   Aspirin Use: n/a      Family History  Updated: yes  FHS-7 score: 0    Anticipatory Guidance  Diet: trying to eat healthy  Exercise: regular - 3 days/week  Substance Abuse: no  Safe in relationship.   Seat belts, bike helmet, gun safety discussed.  Sun protection used.  Dental home.    Cancer screening  Colorectal Cancer Screening: due    Lung Cancer Screening: n/a - never smoker    Cervical Cancer Screening: due   Breast Cancer Screening: ordered    Infectious disease screening/Immunizations  --STI Screening: declined   --Practices safe sex.  --HIV Screening: reports completed - neg   --Hepatitis C Screening: completed - neg ()   --Immunizations:    Influenza: completed    HPV:  n/a    Tetanus: due     Shingles: n/a    Pneumococcal: n/a   Hepatitis B: completed    COVID-19: reports completed  Other immunizations: n/a     She  has a past medical history of Allergy and Endometriosis.  She  has a past surgical history that includes laparoscopy (); external fixator application (Left, 2017); and hardware  removal ortho (Left, 02/2020).    Family History   Problem Relation Age of Onset    Cancer Father         melanoma 2005    Autoimmune Disease Maternal Aunt         RA    Stroke Maternal Aunt         subarachnoid hemorrhage 2007    Autoimmune Disease Maternal Aunt         RA    Kidney Disease Paternal Aunt         CKD maybe from NSAIDs    Heart Disease Other     Diabetes Neg Hx     Ovarian Cancer Neg Hx     Tubal Cancer Neg Hx     Peritoneal Cancer Neg Hx     Colorectal Cancer Neg Hx     Breast Cancer Neg Hx        Social History     Socioeconomic History    Marital status: Single     Spouse name: Not on file    Number of children: Not on file    Years of education: Not on file    Highest education level: Doctorate   Occupational History    Not on file   Tobacco Use    Smoking status: Never    Smokeless tobacco: Never   Vaping Use    Vaping status: Never Used   Substance and Sexual Activity    Alcohol use: No     Alcohol/week: 0.0 oz    Drug use: Never    Sexual activity: Not Currently     Partners: Male   Other Topics Concern    Not on file   Social History Narrative    Not on file     Social Drivers of Health     Financial Resource Strain: Low Risk  (3/29/2022)    Overall Financial Resource Strain (CARDIA)     Difficulty of Paying Living Expenses: Not hard at all   Food Insecurity: No Food Insecurity (3/29/2022)    Hunger Vital Sign     Worried About Running Out of Food in the Last Year: Never true     Ran Out of Food in the Last Year: Never true   Transportation Needs: No Transportation Needs (3/29/2022)    PRAPARE - Transportation     Lack of Transportation (Medical): No     Lack of Transportation (Non-Medical): No   Physical Activity: Sufficiently Active (3/29/2022)    Exercise Vital Sign     Days of Exercise per Week: 4 days     Minutes of Exercise per Session: 50 min   Stress: No Stress Concern Present (3/29/2022)    [employer] - Occupational Stress Questionnaire     Feeling of  Stress : Only a little   Social Connections: Moderately Integrated (3/29/2022)    Social Connection and Isolation Panel [NHANES]     Frequency of Communication with Friends and Family: Three times a week     Frequency of Social Gatherings with Friends and Family: Three times a week     Attends Denominational Services: More than 4 times per year     Active Member of Clubs or Organizations: Yes     Attends Club or Organization Meetings: More than 4 times per year     Marital Status: Never    Intimate Partner Violence: Not on file   Housing Stability: Low Risk  (3/29/2022)    Housing Stability Vital Sign     Unable to Pay for Housing in the Last Year: No     Number of Places Lived in the Last Year: 1     Unstable Housing in the Last Year: No       Patient Active Problem List    Diagnosis Date Noted    CKD (chronic kidney disease) stage 2, GFR 60-89 ml/min 09/19/2024    Postmenopausal 09/24/2021    Jugular venous distension 09/24/2021    Macrocytosis 06/13/2017    Irritable bowel syndrome with constipation 10/26/2016         Current Outpatient Medications   Medication Sig Dispense Refill    sumatriptan (IMITREX) 100 MG tablet Take 1 Tablet by mouth 1 time a day as needed FOR MIGRAINE. May take 2nd dose after 2 hours if symptoms persist. 9 Tablet 2    zinc sulfate (ZINCATE) 220 (50 Zn) MG Cap Take 50 mg by mouth every day.      Multiple Vitamins-Minerals (MULTIVITAMIN WOMEN) Tab Take  by mouth.      magnesium oxide (MAG-OX) 400 MG Tab tablet Take 400 mg by mouth 1 time a day as needed (constipation).      Calcium-Vitamin D-Vitamin K (CALCIUM + D PO) 500-1000 mg Ca daily + 400 IU vit D daily       No current facility-administered medications for this visit.     No Known Allergies    Review of Systems   Constitutional: Negative for fever, chills.   HENT: Negative for congestion.    Eyes: Negative for pain.    Respiratory: Negative for shortness of breath.  Cardiovascular: Negative for leg swelling.   Gastrointestinal:  "Negative for nausea, vomiting.  Genitourinary: Negative for hematuria.   Skin: Negative for rash.   Neurological: Negative for dizziness.   Endo/Heme/Allergies: Does not bleed easily.   Psychiatric/Behavioral: Negative for depression.  The patient is not nervous/anxious.      Objective:     /64 (BP Location: Right arm, Patient Position: Sitting, BP Cuff Size: Adult)   Pulse 64   Ht 1.626 m (5' 4\")   Wt 61.1 kg (134 lb 9.6 oz)   SpO2 96%   BMI 23.10 kg/m²   Body mass index is 23.1 kg/m².  Wt Readings from Last 4 Encounters:   05/16/25 61.1 kg (134 lb 9.6 oz)   09/20/24 59.9 kg (132 lb)   05/10/24 60.7 kg (133 lb 12.8 oz)   12/01/23 61.2 kg (135 lb)       Physical Exam:  Constitutional: Well-developed and well-nourished. Not diaphoretic. No distress.   Skin: Skin is warm and dry. No rash noted.  Head: Atraumatic without lesions.  Eyes: Conjunctivae and extraocular motions are normal. Pupils are equal, round, and reactive to light. No scleral icterus.   Ears:  External ears unremarkable. Tympanic membranes clear and intact.  Mouth/Throat: Dentition is good. Tongue normal. Oropharynx is clear and moist. Posterior pharynx without erythema or exudates.  Neck: Supple, trachea midline. Normal range of motion. No thyromegaly present. No lymphadenopathy--cervical or supraclavicular.  Cardiovascular: Regular rate and rhythm, S1 and S2 without murmur, rubs, or gallops.  Lungs: Normal inspiratory effort, CTA bilaterally, no wheezes/rhonchi/rales  Abdomen: Soft, non tender, and without distention. Active bowel sounds in all four quadrants. No rebound, guarding, masses or HSM.  Extremities: No cyanosis, clubbing, erythema, nor edema. Distal pulses intact and symmetric.   Musculoskeletal: All major joints AROM full in all directions without pain.  Neurological: Alert and oriented x 3. DTRs 2+/3 and symmetric. No cranial nerve deficit. 5/5 myotomes. Sensation intact.   Psychiatric:  Behavior, mood, and affect are " appropriate.    Assessment and Plan:     1. Wellness examination        2. Encounter for screening mammogram for malignant neoplasm of breast  MA-SCREENING MAMMO BILAT W/TOMOSYNTHESIS W/CAD            HCM:  Up to date   Labs per orders  Immunizations per orders  Patient counseled about skin care, diet, supplements, prenatal vitamins, safe sex and exercise.    Referral for genetic research was offered. Patient previously declined.      Follow-up: Return in about 1 year (around 5/16/2026) for Annual/wellness visit.

## 2025-05-19 ENCOUNTER — PHARMACY VISIT (OUTPATIENT)
Dept: PHARMACY | Facility: MEDICAL CENTER | Age: 45
End: 2025-05-19
Payer: COMMERCIAL

## 2025-06-13 ENCOUNTER — HOSPITAL ENCOUNTER (OUTPATIENT)
Facility: MEDICAL CENTER | Age: 45
End: 2025-06-13
Attending: FAMILY MEDICINE
Payer: COMMERCIAL

## 2025-06-13 ENCOUNTER — RESULTS FOLLOW-UP (OUTPATIENT)
Dept: MEDICAL GROUP | Facility: PHYSICIAN GROUP | Age: 45
End: 2025-06-13

## 2025-06-13 ENCOUNTER — APPOINTMENT (OUTPATIENT)
Dept: RADIOLOGY | Facility: MEDICAL CENTER | Age: 45
End: 2025-06-13
Attending: FAMILY MEDICINE
Payer: COMMERCIAL

## 2025-06-13 DIAGNOSIS — Z12.31 ENCOUNTER FOR SCREENING MAMMOGRAM FOR MALIGNANT NEOPLASM OF BREAST: ICD-10-CM

## 2025-06-13 PROCEDURE — 77063 BREAST TOMOSYNTHESIS BI: CPT

## 2025-08-14 ENCOUNTER — PHARMACY VISIT (OUTPATIENT)
Dept: PHARMACY | Facility: MEDICAL CENTER | Age: 45
End: 2025-08-14
Payer: COMMERCIAL

## 2025-08-14 PROCEDURE — RXOTC WILLOW AMBULATORY OTC CHARGE

## (undated) DEVICE — KIT ROOM DECONTAMINATION

## (undated) DEVICE — GOWN WARMING STANDARD FLEX - (30/CA)

## (undated) DEVICE — EXFX SN TRAC PIN SHORT 5X50MM - (2TX4=8)

## (undated) DEVICE — SET EXTENSION WITH 2 PORTS (48EA/CA) ***PART #2C8610 IS A SUBSTITUTE*****

## (undated) DEVICE — TOWELS CLOTH SURGICAL - (4/PK 20PK/CA)

## (undated) DEVICE — GLOVE BIOGEL INDICATOR SZ 8 SURGICAL PF LTX - (50/BX 4BX/CA)

## (undated) DEVICE — PACK LOWER EXTREMITY - (2/CA)

## (undated) DEVICE — CHLORAPREP 26 ML APPLICATOR - ORANGE TINT(25/CA)

## (undated) DEVICE — MASK ANESTHESIA ADULT  - (100/CA)

## (undated) DEVICE — SET LEADWIRE 5 LEAD BEDSIDE DISPOSABLE ECG (1SET OF 5/EA)

## (undated) DEVICE — EXFX SN CLAMP 4-HOLE PIN - (2TX4=8)

## (undated) DEVICE — ELECTRODE DUAL RETURN W/ CORD - (50/PK)

## (undated) DEVICE — LACTATED RINGERS INJ 1000 ML - (14EA/CA 60CA/PF)

## (undated) DEVICE — ELECTRODE 850 FOAM ADHESIVE - HYDROGEL RADIOTRNSPRNT (50/PK)

## (undated) DEVICE — SUCTION INSTRUMENT YANKAUER BULBOUS TIP W/O VENT (50EA/CA)

## (undated) DEVICE — CANISTER SUCTION 3000ML MECHANICAL FILTER AUTO SHUTOFF MEDI-VAC NONSTERILE LF DISP  (40EA/CA)

## (undated) DEVICE — BANDAGE ROLL STERILE BULKEE 4.5 IN X 4 YD (100EA/CA)

## (undated) DEVICE — HEAD HOLDER JUNIOR/ADULT

## (undated) DEVICE — TUBING CLEARLINK DUO-VENT - C-FLO (48EA/CA)

## (undated) DEVICE — GLOVE BIOGEL SZ 8 SURGICAL PF LTX - (50PR/BX 4BX/CA)

## (undated) DEVICE — GLOVE BIOGEL INDICATOR SZ 7.5 SURGICAL PF LTX - (50PR/BX 4BX/CA)

## (undated) DEVICE — BLANKET WARMING UPPER BODY - (10/CA)

## (undated) DEVICE — SENSOR SPO2 NEO LNCS ADHESIVE (20/BX) SEE USER NOTES

## (undated) DEVICE — Device

## (undated) DEVICE — BOVIE BLADE COATED - (50/PK)

## (undated) DEVICE — DETERGENT RENUZYME PLUS 10 OZ PACKET (50/BX)

## (undated) DEVICE — DRAPE U ORTHOPEDIC - (10/BX)

## (undated) DEVICE — GLOVE BIOGEL SZ 7.5 SURGICAL PF LTX - (50PR/BX 4BX/CA)

## (undated) DEVICE — SODIUM CHL IRRIGATION 0.9% 1000ML (12EA/CA)

## (undated) DEVICE — TUBE E-T HI-LO CUFF 6.0MM (10/PK)

## (undated) DEVICE — PROTECTOR ULNA NERVE - (36PR/CA)

## (undated) DEVICE — KIT ANESTHESIA W/CIRCUIT & 3/LT BAG W/FILTER (20EA/CA)

## (undated) DEVICE — DRAPE MICROSCOPE X-LONG (10EA/CA)

## (undated) DEVICE — NEPTUNE 4 PORT MANIFOLD - (20/PK)

## (undated) DEVICE — DRAPE STRLE REG TOWEL 18X24 - (10/BX 4BX/CA)"

## (undated) DEVICE — PAD LAP STERILE 18 X 18 - (5/PK 40PK/CA)

## (undated) DEVICE — GLOVE, BIOGEL ECLIPSE, SZ 7.0, PF LTX (50/BX)